# Patient Record
Sex: FEMALE | Race: WHITE | Employment: OTHER | URBAN - NONMETROPOLITAN AREA
[De-identification: names, ages, dates, MRNs, and addresses within clinical notes are randomized per-mention and may not be internally consistent; named-entity substitution may affect disease eponyms.]

---

## 2017-05-23 ENCOUNTER — OFFICE VISIT (OUTPATIENT)
Dept: FAMILY MEDICINE CLINIC | Age: 77
End: 2017-05-23

## 2017-05-23 VITALS
SYSTOLIC BLOOD PRESSURE: 160 MMHG | BODY MASS INDEX: 25.43 KG/M2 | HEART RATE: 60 BPM | HEIGHT: 67 IN | OXYGEN SATURATION: 98 % | DIASTOLIC BLOOD PRESSURE: 82 MMHG | WEIGHT: 162 LBS

## 2017-05-23 DIAGNOSIS — L85.3 XEROSIS OF SKIN: ICD-10-CM

## 2017-05-23 DIAGNOSIS — L57.0 ACTINIC KERATOSIS: Primary | ICD-10-CM

## 2017-05-23 PROCEDURE — 1036F TOBACCO NON-USER: CPT | Performed by: FAMILY MEDICINE

## 2017-05-23 PROCEDURE — 17110 DESTRUCTION B9 LES UP TO 14: CPT | Performed by: FAMILY MEDICINE

## 2017-05-23 PROCEDURE — 99999 PR OFFICE/OUTPT VISIT,PROCEDURE ONLY: CPT | Performed by: FAMILY MEDICINE

## 2017-05-23 ASSESSMENT — PATIENT HEALTH QUESTIONNAIRE - PHQ9
SUM OF ALL RESPONSES TO PHQ9 QUESTIONS 1 & 2: 0
SUM OF ALL RESPONSES TO PHQ QUESTIONS 1-9: 0
1. LITTLE INTEREST OR PLEASURE IN DOING THINGS: 0
2. FEELING DOWN, DEPRESSED OR HOPELESS: 0

## 2017-05-23 ASSESSMENT — ENCOUNTER SYMPTOMS
SHORTNESS OF BREATH: 0
ABDOMINAL PAIN: 0

## 2017-06-29 ENCOUNTER — OFFICE VISIT (OUTPATIENT)
Dept: FAMILY MEDICINE CLINIC | Age: 77
End: 2017-06-29

## 2017-06-29 VITALS
WEIGHT: 159.2 LBS | OXYGEN SATURATION: 98 % | HEIGHT: 67 IN | DIASTOLIC BLOOD PRESSURE: 60 MMHG | SYSTOLIC BLOOD PRESSURE: 128 MMHG | HEART RATE: 58 BPM | BODY MASS INDEX: 24.99 KG/M2

## 2017-06-29 DIAGNOSIS — D36.10 NEUROFIBROMA: Primary | ICD-10-CM

## 2017-06-29 DIAGNOSIS — L57.0 ACTINIC KERATOSES: ICD-10-CM

## 2017-06-29 PROCEDURE — 17110 DESTRUCTION B9 LES UP TO 14: CPT | Performed by: FAMILY MEDICINE

## 2017-06-29 PROCEDURE — 11303 SHAVE SKIN LESION >2.0 CM: CPT | Performed by: FAMILY MEDICINE

## 2017-06-29 PROCEDURE — 1036F TOBACCO NON-USER: CPT | Performed by: FAMILY MEDICINE

## 2017-06-29 ASSESSMENT — ENCOUNTER SYMPTOMS
ABDOMINAL PAIN: 0
SHORTNESS OF BREATH: 0

## 2017-06-30 ENCOUNTER — TELEPHONE (OUTPATIENT)
Dept: FAMILY MEDICINE CLINIC | Age: 77
End: 2017-06-30

## 2017-06-30 RX ORDER — CEPHALEXIN 500 MG/1
500 CAPSULE ORAL 3 TIMES DAILY
Qty: 30 CAPSULE | Refills: 0 | Status: SHIPPED | OUTPATIENT
Start: 2017-06-30 | End: 2018-03-19 | Stop reason: ALTCHOICE

## 2017-07-03 ENCOUNTER — OFFICE VISIT (OUTPATIENT)
Dept: FAMILY MEDICINE CLINIC | Age: 77
End: 2017-07-03

## 2017-07-03 VITALS
HEART RATE: 55 BPM | WEIGHT: 157.6 LBS | DIASTOLIC BLOOD PRESSURE: 60 MMHG | OXYGEN SATURATION: 97 % | HEIGHT: 67 IN | SYSTOLIC BLOOD PRESSURE: 120 MMHG | BODY MASS INDEX: 24.74 KG/M2

## 2017-07-03 DIAGNOSIS — S81.802A WOUND OF LEFT LEG, INITIAL ENCOUNTER: Primary | ICD-10-CM

## 2017-07-03 PROCEDURE — 99024 POSTOP FOLLOW-UP VISIT: CPT | Performed by: FAMILY MEDICINE

## 2017-07-03 ASSESSMENT — ENCOUNTER SYMPTOMS
ABDOMINAL PAIN: 0
SHORTNESS OF BREATH: 0

## 2018-03-19 ENCOUNTER — OFFICE VISIT (OUTPATIENT)
Dept: FAMILY MEDICINE CLINIC | Age: 78
End: 2018-03-19
Payer: MEDICARE

## 2018-03-19 VITALS
HEART RATE: 55 BPM | BODY MASS INDEX: 24.86 KG/M2 | HEIGHT: 67 IN | DIASTOLIC BLOOD PRESSURE: 62 MMHG | OXYGEN SATURATION: 96 % | SYSTOLIC BLOOD PRESSURE: 128 MMHG | WEIGHT: 158.4 LBS

## 2018-03-19 DIAGNOSIS — B07.9 VIRAL WARTS, UNSPECIFIED TYPE: ICD-10-CM

## 2018-03-19 DIAGNOSIS — L57.0 ACTINIC KERATOSES: Primary | ICD-10-CM

## 2018-03-19 PROCEDURE — 99212 OFFICE O/P EST SF 10 MIN: CPT | Performed by: FAMILY MEDICINE

## 2018-03-19 PROCEDURE — G8400 PT W/DXA NO RESULTS DOC: HCPCS | Performed by: FAMILY MEDICINE

## 2018-03-19 PROCEDURE — 17003 DESTRUCT PREMALG LES 2-14: CPT | Performed by: FAMILY MEDICINE

## 2018-03-19 PROCEDURE — 4040F PNEUMOC VAC/ADMIN/RCVD: CPT | Performed by: FAMILY MEDICINE

## 2018-03-19 PROCEDURE — G8427 DOCREV CUR MEDS BY ELIG CLIN: HCPCS | Performed by: FAMILY MEDICINE

## 2018-03-19 PROCEDURE — 1123F ACP DISCUSS/DSCN MKR DOCD: CPT | Performed by: FAMILY MEDICINE

## 2018-03-19 PROCEDURE — 3017F COLORECTAL CA SCREEN DOC REV: CPT | Performed by: FAMILY MEDICINE

## 2018-03-19 PROCEDURE — 1036F TOBACCO NON-USER: CPT | Performed by: FAMILY MEDICINE

## 2018-03-19 PROCEDURE — 1090F PRES/ABSN URINE INCON ASSESS: CPT | Performed by: FAMILY MEDICINE

## 2018-03-19 PROCEDURE — G8484 FLU IMMUNIZE NO ADMIN: HCPCS | Performed by: FAMILY MEDICINE

## 2018-03-19 PROCEDURE — G8420 CALC BMI NORM PARAMETERS: HCPCS | Performed by: FAMILY MEDICINE

## 2018-03-19 PROCEDURE — 17000 DESTRUCT PREMALG LESION: CPT | Performed by: FAMILY MEDICINE

## 2018-03-19 ASSESSMENT — ENCOUNTER SYMPTOMS
SHORTNESS OF BREATH: 0
ABDOMINAL PAIN: 0

## 2018-03-19 NOTE — PROGRESS NOTES
Subjective  Jimbo Guerrero, 78 y.o. female presents today with:  Chief Complaint   Patient presents with    6 Month Follow-Up       HPI     Here today for 6 month f/u on skin. She has a h/o AK's. The excision from her left lower leg has healed well. Also c/o wart on right index finger. Really does not want LN2 for wart. Review of Systems   Constitutional: Negative for fever. Respiratory: Negative for shortness of breath. Cardiovascular: Negative for chest pain. Gastrointestinal: Negative for abdominal pain. Skin: Negative for rash. Past Medical History:   Diagnosis Date    Actinic keratoses     Hyperlipidemia     Hypertension     Hypothyroidism     Type II or unspecified type diabetes mellitus without mention of complication, not stated as uncontrolled     diet controlled    Xerosis of skin      History reviewed. No pertinent surgical history. Social History     Social History    Marital status:      Spouse name: N/A    Number of children: N/A    Years of education: N/A     Occupational History    Not on file. Social History Main Topics    Smoking status: Never Smoker    Smokeless tobacco: Never Used    Alcohol use Not on file    Drug use: No    Sexual activity: Yes     Other Topics Concern    Not on file     Social History Narrative    No narrative on file     History reviewed. No pertinent family history.   Allergies   Allergen Reactions    Erythromycin     Pcn [Penicillins]     Sulfa Antibiotics      Current Outpatient Prescriptions   Medication Sig Dispense Refill    ammonium lactate (LAC-HYDRIN) 12 % lotion Apply topically twice a day as needed 500 g 5    carvedilol (COREG) 25 MG tablet Take 25 mg by mouth 2 times daily (with meals)      levothyroxine (SYNTHROID) 75 MCG tablet       lisinopril (PRINIVIL;ZESTRIL) 20 MG tablet       lovastatin (MEVACOR) 20 MG tablet       lisinopril-hydrochlorothiazide (PRINZIDE;ZESTORETIC) 20-25 MG per tablet        No current facility-administered medications for this visit. Objective    Vitals:    03/19/18 0937   BP: 128/62   Pulse: 55   SpO2: 96%   Weight: 158 lb 6.4 oz (71.8 kg)   Height: 5' 7\" (1.702 m)       Physical Exam   Constitutional: She appears well-developed and well-nourished. HENT:   Head: Normocephalic and atraumatic. Mouth/Throat: No oropharyngeal exudate. Neck: Normal range of motion. Neck supple. Skin: Skin is warm and dry. Left lower leg with well healed surgical site with no evidence of residual or recurrence of the lesion    Erythematous scaly papules in the following locations: right upper arm x 1, left dorsal hand x 1, right forearm x 1, anterior to left ear x 1, left cheek x 1, under left lateral eyebrow x 1, right upper chest x 1, left upper chest x 1, left upper back x 1. Skin colored verrucous papules in the following locations: right index finger x 1. Psychiatric: She has a normal mood and affect. Assessment & Plan   1. Actinic keratoses  85057 - [place] DESTRUC PREMALIGNANT,2-14 LESIONS   2. Viral warts, unspecified type       LN2 times 3 seconds to affected areas times 9 lesion(s). Informed consent given was given. Risks including infection, bleeding, scarring discussed. No guarantee how scars will look. Post op instructions given. Best to leave blisters alone if they form. If blister(s) pop or patient pops with a sterilized needed, apply antibiotic ointment and a bandage to affected area(s). For the wart, First file down the wart with a disposable nail file then throw away the nail file. Then make a vaseline well around the wart. Apply compound W on the wart twice a day and cover with bandaid. Orders Placed This Encounter   Procedures    42330 - [place] DESTRUC PREMALIGNANT,2-14 LESIONS     No orders of the defined types were placed in this encounter.     Medications Discontinued During This Encounter   Medication Reason    cephALEXin (KEFLEX) 500 MG capsule Therapy completed     Return in about 6 months (around 9/19/2018).     Oretha Baumgarten, MD

## 2018-08-29 ENCOUNTER — OFFICE VISIT (OUTPATIENT)
Dept: FAMILY MEDICINE CLINIC | Age: 78
End: 2018-08-29
Payer: MEDICARE

## 2018-08-29 ENCOUNTER — TELEPHONE (OUTPATIENT)
Dept: FAMILY MEDICINE CLINIC | Age: 78
End: 2018-08-29

## 2018-08-29 VITALS
BODY MASS INDEX: 24.75 KG/M2 | SYSTOLIC BLOOD PRESSURE: 130 MMHG | DIASTOLIC BLOOD PRESSURE: 70 MMHG | WEIGHT: 154 LBS | HEIGHT: 66 IN | HEART RATE: 58 BPM | OXYGEN SATURATION: 98 %

## 2018-08-29 DIAGNOSIS — K13.0 LIP LESION: ICD-10-CM

## 2018-08-29 DIAGNOSIS — K13.70 MOUTH LESION: ICD-10-CM

## 2018-08-29 DIAGNOSIS — E03.9 HYPOTHYROIDISM, UNSPECIFIED TYPE: ICD-10-CM

## 2018-08-29 DIAGNOSIS — I10 ESSENTIAL HYPERTENSION: Primary | ICD-10-CM

## 2018-08-29 DIAGNOSIS — K13.0 ANGULAR CHEILITIS: Primary | ICD-10-CM

## 2018-08-29 PROCEDURE — 4040F PNEUMOC VAC/ADMIN/RCVD: CPT | Performed by: FAMILY MEDICINE

## 2018-08-29 PROCEDURE — 1123F ACP DISCUSS/DSCN MKR DOCD: CPT | Performed by: FAMILY MEDICINE

## 2018-08-29 PROCEDURE — 99213 OFFICE O/P EST LOW 20 MIN: CPT | Performed by: FAMILY MEDICINE

## 2018-08-29 PROCEDURE — G8420 CALC BMI NORM PARAMETERS: HCPCS | Performed by: FAMILY MEDICINE

## 2018-08-29 PROCEDURE — G8400 PT W/DXA NO RESULTS DOC: HCPCS | Performed by: FAMILY MEDICINE

## 2018-08-29 PROCEDURE — 1036F TOBACCO NON-USER: CPT | Performed by: FAMILY MEDICINE

## 2018-08-29 PROCEDURE — 1090F PRES/ABSN URINE INCON ASSESS: CPT | Performed by: FAMILY MEDICINE

## 2018-08-29 PROCEDURE — 1101F PT FALLS ASSESS-DOCD LE1/YR: CPT | Performed by: FAMILY MEDICINE

## 2018-08-29 PROCEDURE — G8427 DOCREV CUR MEDS BY ELIG CLIN: HCPCS | Performed by: FAMILY MEDICINE

## 2018-08-29 PROCEDURE — 3017F COLORECTAL CA SCREEN DOC REV: CPT | Performed by: FAMILY MEDICINE

## 2018-08-29 RX ORDER — CARVEDILOL 12.5 MG/1
12.5 TABLET ORAL 2 TIMES DAILY WITH MEALS
Qty: 60 TABLET | Refills: 0
Start: 2018-08-29

## 2018-08-29 RX ORDER — LEVOTHYROXINE SODIUM 0.07 MG/1
TABLET ORAL
Qty: 15 TABLET | Refills: 0
Start: 2018-08-29

## 2018-08-29 ASSESSMENT — ENCOUNTER SYMPTOMS
COUGH: 0
SHORTNESS OF BREATH: 0
ABDOMINAL PAIN: 0
DIARRHEA: 0
SORE THROAT: 0

## 2018-08-29 NOTE — PROGRESS NOTES
Subjective  Justin German, 78 y.o. female presents today with:  Chief Complaint   Patient presents with    Lesion(s)     lesion on lip       Rash   This is a new problem. The current episode started 1 to 4 weeks ago. The problem has been waxing and waning since onset. Location: left corner of mouth. The rash is characterized by redness, dryness and burning. She was exposed to nothing. Pertinent negatives include no cough, diarrhea, fever, shortness of breath or sore throat. Treatments tried: Orvis Carolyn. The treatment provided mild relief. Also c/o wart on right index finger. Does not want LN2 for wart. She has her regular 6 month f/u next month. Review of Systems   Constitutional: Negative for fever. HENT: Negative for sore throat. Respiratory: Negative for cough and shortness of breath. Cardiovascular: Negative for chest pain. Gastrointestinal: Negative for abdominal pain and diarrhea. Skin: Negative for rash. Past Medical History:   Diagnosis Date    Actinic keratoses     Hyperlipidemia     Hypertension     Hypothyroidism     Type II or unspecified type diabetes mellitus without mention of complication, not stated as uncontrolled     diet controlled    Xerosis of skin      No past surgical history on file. Social History     Social History    Marital status:      Spouse name: N/A    Number of children: N/A    Years of education: N/A     Occupational History    Not on file. Social History Main Topics    Smoking status: Never Smoker    Smokeless tobacco: Never Used    Alcohol use Not on file    Drug use: No    Sexual activity: Yes     Other Topics Concern    Not on file     Social History Narrative    No narrative on file     No family history on file.   Allergies   Allergen Reactions    Erythromycin     Other      Flu vaccines    Pcn [Penicillins]     Sulfa Antibiotics      Current Outpatient Prescriptions   Medication Sig Dispense Refill    mupirocin (BACTROBAN) 2 % ointment Apply 3 times daily and at night to left corner of mouth. 30 g 1    carvedilol (COREG) 25 MG tablet Take 25 mg by mouth 2 times daily (with meals)      levothyroxine (SYNTHROID) 75 MCG tablet       lisinopril-hydrochlorothiazide (PRINZIDE;ZESTORETIC) 20-25 MG per tablet       lisinopril (PRINIVIL;ZESTRIL) 20 MG tablet       lovastatin (MEVACOR) 20 MG tablet        No current facility-administered medications for this visit. Objective    Vitals:    08/29/18 0832   Weight: 154 lb (69.9 kg)   Height: 5' 6\" (1.676 m)       Physical Exam   Constitutional: She appears well-developed and well-nourished. HENT:   Head: Normocephalic and atraumatic. Mouth/Throat: No oropharyngeal exudate. Neck: Normal range of motion. Neck supple. Skin: Skin is warm and dry. Skin colored verrucous papules in the following locations: right index finger x 1. Corner of mouth with erythema and scaling. Psychiatric: She has a normal mood and affect. Assessment & Plan    Diagnosis Orders   1. Angular cheilitis  mupirocin (BACTROBAN) 2 % ointment   2. Lip lesion  Amb External Referral To General Surgery   3. Mouth lesion  Amb External Referral To General Surgery     Plan as noted above. Use mupirocin tid and at night. Referred to oral surgery if not better in 2 weeks. F/u as scheduled for LN2. Will send rx for fluowart for wart. Orders Placed This Encounter   Procedures    Amb External Referral To General Surgery     Referral Priority:   Routine     Requested Specialty:   Oral Surgery     Number of Visits Requested:   1     Orders Placed This Encounter   Medications    mupirocin (BACTROBAN) 2 % ointment     Sig: Apply 3 times daily and at night to left corner of mouth.      Dispense:  30 g     Refill:  1     Medications Discontinued During This Encounter   Medication Reason    ammonium lactate (LAC-HYDRIN) 12 % lotion LIST CLEANUP     Return in about 2 weeks (around 9/12/2018) for as scheduled.     Madison Moore MD

## 2018-09-10 ENCOUNTER — OFFICE VISIT (OUTPATIENT)
Dept: FAMILY MEDICINE CLINIC | Age: 78
End: 2018-09-10
Payer: MEDICARE

## 2018-09-10 VITALS
SYSTOLIC BLOOD PRESSURE: 120 MMHG | HEIGHT: 65 IN | BODY MASS INDEX: 25.66 KG/M2 | WEIGHT: 154 LBS | DIASTOLIC BLOOD PRESSURE: 80 MMHG

## 2018-09-10 DIAGNOSIS — L65.9 THINNING HAIR: ICD-10-CM

## 2018-09-10 DIAGNOSIS — L57.0 ACTINIC KERATOSES: Primary | ICD-10-CM

## 2018-09-10 DIAGNOSIS — L29.9 PRURITUS OF SKIN: ICD-10-CM

## 2018-09-10 PROCEDURE — 99213 OFFICE O/P EST LOW 20 MIN: CPT | Performed by: FAMILY MEDICINE

## 2018-09-10 PROCEDURE — 17110 DESTRUCTION B9 LES UP TO 14: CPT | Performed by: FAMILY MEDICINE

## 2018-09-10 ASSESSMENT — ENCOUNTER SYMPTOMS
SHORTNESS OF BREATH: 0
ABDOMINAL PAIN: 0

## 2018-09-10 NOTE — PROGRESS NOTES
Subjective  Leafy Seeds, 78 y.o. female presents today with:  Chief Complaint   Patient presents with    Check-Up     skin       HPI   Here today for 6 month skin check. I saw her in August for a wart on right index finger that she is using compond W on. She also saw the oral surgeon and he dentis since I last saw her who told her that the lesion inside her mouth on the left was nothing to worry about. She has some new itchy spots. Rash gone at corner of mouth. Also c/o thinning hair, no discrete bald patches. Review of Systems   Constitutional: Negative for fever. Respiratory: Negative for shortness of breath. Cardiovascular: Negative for chest pain. Gastrointestinal: Negative for abdominal pain. Skin: Negative for rash. Past Medical History:   Diagnosis Date    Actinic keratoses     Hyperlipidemia     Hypertension     Hypothyroidism     Type II or unspecified type diabetes mellitus without mention of complication, not stated as uncontrolled     diet controlled    Xerosis of skin      No past surgical history on file. Social History     Social History    Marital status:      Spouse name: N/A    Number of children: N/A    Years of education: N/A     Occupational History    Not on file. Social History Main Topics    Smoking status: Never Smoker    Smokeless tobacco: Never Used    Alcohol use Not on file    Drug use: No    Sexual activity: Yes     Other Topics Concern    Not on file     Social History Narrative    No narrative on file     No family history on file.   Allergies   Allergen Reactions    Erythromycin     Other      Flu vaccines    Pcn [Penicillins]     Sulfa Antibiotics      Current Outpatient Prescriptions   Medication Sig Dispense Refill    carvedilol (COREG) 12.5 MG tablet Take 1 tablet by mouth 2 times daily (with meals) 60 tablet 0    levothyroxine (SYNTHROID) 75 MCG tablet 1/2 tab po daily 15 tablet 0    lisinopril-hydrochlorothiazide (PRINZIDE;ZESTORETIC) 20-25 MG per tablet       lisinopril (PRINIVIL;ZESTRIL) 20 MG tablet       lovastatin (MEVACOR) 20 MG tablet        No current facility-administered medications for this visit. Objective    Vitals:    09/10/18 1103   BP: 120/80   Weight: 154 lb (69.9 kg)   Height: 5' 5\" (1.651 m)       Physical Exam   Constitutional: She appears well-developed and well-nourished. HENT:   Head: Normocephalic and atraumatic. Mouth/Throat: No oropharyngeal exudate. Neck: Normal range of motion. Neck supple. Skin: Skin is warm and dry. Erythematous scaly papules in the following locations: right lateral cheek x 2, left lower leg x 1,    Psychiatric: She has a normal mood and affect. Assessment & Plan    Diagnosis Orders   1. Actinic keratoses  01714 - [place] DESTRUC PREMALIGNANT,2-14 LESIONS   2. Pruritus of skin  43987 - [place] DESTRUC PREMALIGNANT,2-14 LESIONS   3. Thinning hair       LN2 times 3 seconds to affected areas times 3 lesion(s). Informed consent given was given. Risks including infection, bleeding, scarring discussed. No guarantee how scars will look. Post op instructions given. Best to leave blisters alone if they form. If blister(s) pop or patient pops with a sterilized needed, apply antibiotic ointment and a bandage to affected area(s). For hair loss the following is recommended:  a multivitamin daily such as Centrum or Centrum Silver for adults 50 and older, Biotin Forte 5 mg daily, and 4 ounces of soy daily (if there is no family history of breast cancer). You should also consider a shampoo and conditioning line called Nioxin which is available at your hair salon. Another option is to consider the use of Rogaine for men or women. Orders Placed This Encounter   Procedures    40535 - [place] DESTRUC PREMALIGNANT,2-14 LESIONS     No orders of the defined types were placed in this encounter. There are no discontinued medications.   Return in about 6 months (around 3/10/2019).     Johnathan Strickland MD

## 2023-11-14 ENCOUNTER — APPOINTMENT (OUTPATIENT)
Dept: URBAN - METROPOLITAN AREA CLINIC 204 | Age: 83
Setting detail: DERMATOLOGY
End: 2023-11-15

## 2023-11-14 PROCEDURE — 99213 OFFICE O/P EST LOW 20 MIN: CPT | Mod: 25

## 2023-11-14 PROCEDURE — 17003 DESTRUCT PREMALG LES 2-14: CPT

## 2023-11-14 PROCEDURE — 17000 DESTRUCT PREMALG LESION: CPT

## 2023-11-14 PROCEDURE — OTHER MIPS QUALITY: OTHER

## 2023-12-15 ENCOUNTER — OFFICE VISIT (OUTPATIENT)
Dept: CARDIOLOGY | Facility: CLINIC | Age: 83
End: 2023-12-15
Payer: MEDICARE

## 2023-12-15 VITALS
SYSTOLIC BLOOD PRESSURE: 152 MMHG | DIASTOLIC BLOOD PRESSURE: 64 MMHG | OXYGEN SATURATION: 99 % | HEART RATE: 65 BPM | WEIGHT: 151 LBS | BODY MASS INDEX: 24.37 KG/M2

## 2023-12-15 DIAGNOSIS — E11.9 DIABETES MELLITUS TYPE II, NON INSULIN DEPENDENT (MULTI): ICD-10-CM

## 2023-12-15 DIAGNOSIS — R42 DIZZINESS: ICD-10-CM

## 2023-12-15 DIAGNOSIS — E78.5 HYPERLIPIDEMIA, UNSPECIFIED HYPERLIPIDEMIA TYPE: ICD-10-CM

## 2023-12-15 DIAGNOSIS — R06.02 SHORTNESS OF BREATH: ICD-10-CM

## 2023-12-15 DIAGNOSIS — I35.0 NONRHEUMATIC AORTIC (VALVE) STENOSIS: Primary | ICD-10-CM

## 2023-12-15 DIAGNOSIS — I10 HYPERTENSION, UNSPECIFIED TYPE: ICD-10-CM

## 2023-12-15 PROCEDURE — 1036F TOBACCO NON-USER: CPT | Performed by: STUDENT IN AN ORGANIZED HEALTH CARE EDUCATION/TRAINING PROGRAM

## 2023-12-15 PROCEDURE — 99215 OFFICE O/P EST HI 40 MIN: CPT | Performed by: STUDENT IN AN ORGANIZED HEALTH CARE EDUCATION/TRAINING PROGRAM

## 2023-12-15 PROCEDURE — 3077F SYST BP >= 140 MM HG: CPT | Performed by: STUDENT IN AN ORGANIZED HEALTH CARE EDUCATION/TRAINING PROGRAM

## 2023-12-15 PROCEDURE — 3078F DIAST BP <80 MM HG: CPT | Performed by: STUDENT IN AN ORGANIZED HEALTH CARE EDUCATION/TRAINING PROGRAM

## 2023-12-15 PROCEDURE — 1160F RVW MEDS BY RX/DR IN RCRD: CPT | Performed by: STUDENT IN AN ORGANIZED HEALTH CARE EDUCATION/TRAINING PROGRAM

## 2023-12-15 PROCEDURE — 1159F MED LIST DOCD IN RCRD: CPT | Performed by: STUDENT IN AN ORGANIZED HEALTH CARE EDUCATION/TRAINING PROGRAM

## 2023-12-15 PROCEDURE — 93000 ELECTROCARDIOGRAM COMPLETE: CPT | Performed by: STUDENT IN AN ORGANIZED HEALTH CARE EDUCATION/TRAINING PROGRAM

## 2023-12-15 RX ORDER — LISINOPRIL AND HYDROCHLOROTHIAZIDE 20; 25 MG/1; MG/1
1 TABLET ORAL
COMMUNITY
Start: 2014-07-17

## 2023-12-15 RX ORDER — AMLODIPINE BESYLATE 5 MG/1
5 TABLET ORAL DAILY
COMMUNITY
End: 2024-02-26 | Stop reason: SDUPTHER

## 2023-12-15 RX ORDER — LISINOPRIL 20 MG/1
20 TABLET ORAL NIGHTLY
COMMUNITY
Start: 2014-07-17

## 2023-12-15 RX ORDER — ASPIRIN 81 MG/1
1 TABLET ORAL DAILY
COMMUNITY

## 2023-12-15 RX ORDER — LOVASTATIN 20 MG/1
20 TABLET ORAL NIGHTLY
COMMUNITY
Start: 2018-03-09

## 2023-12-15 RX ORDER — CARVEDILOL 6.25 MG/1
1 TABLET ORAL 2 TIMES DAILY
COMMUNITY
Start: 2023-01-14

## 2023-12-15 RX ORDER — LEVOTHYROXINE SODIUM 75 UG/1
75 TABLET ORAL
COMMUNITY
Start: 2018-08-29

## 2023-12-15 NOTE — PROGRESS NOTES
Chief Complaint   Patient presents with    Dizziness    Shortness of Breath     Pain on her left side in her neck and under her left breast.      HPI:  I was requested by Dr. Jenkins to evaluate this patient in consultation for cardiac assessment.    Mrs. Debo Rodriguez is a 83 y.o. year old never smoker diabetic female patient with prior medical history significant for hypertension, diabetes, hyperlipidemia, moderate aortic stenosis, hypothyroidism. She is currently oligosymptomatic. She endorses shortness of breath on exertion (walking > 10 min) and some episodes of dizziness. She denies  Patient denies chest pain, palpitations, leg edema, headaches, fever, chills, orthopnea, paroxysmal nocturnal dyspnea or syncope.   Serial echocardiograms performed 2022 and 2023 showed likely moderate aortic regurgitation.   EKG shows normal sinus rhythm with incomplete RBBB and no signs of ACS.  Echo (01/2023):   1. Left ventricular systolic function is normal with a 60% estimated ejection fraction.  2. Moderate aortic valve regurgitation.  3. No significant changes compared to prior echocardiogram dated 1/3/2022.      Past Medical History  Past Medical History:   Diagnosis Date    Asymptomatic menopausal state     History of menopause    Encounter for full-term uncomplicated delivery     Normal vaginal delivery    Encounter for gynecological examination (general) (routine) without abnormal findings     Pap test, as part of routine gynecological examination    Other conditions influencing health status     Menstruation    Personal history of other medical treatment     H/O bone density study    Personal history of other medical treatment     History of mammogram       Past Surgical History  Past Surgical History:   Procedure Laterality Date    OTHER SURGICAL HISTORY  12/03/2021    Cataract surgery       Past Family History  No family history on file.    Allergy History  Allergies   Allergen Reactions    Erythromycin  Anaphylaxis, Palpitations and Unknown    Flu Vaccine 2011 (36 Mos+)(Pf) Unknown    Sulfa (Sulfonamide Antibiotics) Other    Penicillins Hives and Unknown       Past Social History  Social History     Socioeconomic History    Marital status:      Spouse name: None    Number of children: None    Years of education: None    Highest education level: None   Occupational History    None   Tobacco Use    Smoking status: Never    Smokeless tobacco: Never   Substance and Sexual Activity    Alcohol use: Never    Drug use: Never    Sexual activity: None   Other Topics Concern    None   Social History Narrative    None     Social Determinants of Health     Financial Resource Strain: Not on file   Food Insecurity: Not on file   Transportation Needs: Not on file   Physical Activity: Not on file   Stress: Not on file   Social Connections: Not on file   Intimate Partner Violence: Not on file   Housing Stability: Not on file       Social History     Tobacco Use   Smoking Status Never   Smokeless Tobacco Never       Review of Systems:  Constitutional: Denies any fever or chills  Eyes: Denies any eye pain or blurry vision  ENT: Denies any ear pain or hearing loss  Cardiovascular: The heart rate is not slow, the heart rate is not fast  Respiratory: Denies any asthma/wheezing  Gastrointestinal: Denies any tammy colored stools or fatty food intolerance  Genitourinary: Denies any blood in the urine or pelvic pain  Musculoskeletal: Denies any swelling in the joints or difficulty walking  Skin: Denies any skin lumps or skin lesions  Neurological: Denies any dizziness/tingling     Objective Data:  Last Recorded Vitals:  Vitals:    12/15/23 1041   BP: 152/64   Pulse: 65   SpO2: 99%   Weight: 68.5 kg (151 lb)       Last Labs:  CBC - No results in last year.  _ _ _    _      CMP - No results in last year.  _ _ _ --- _   _ _ _ _      PTT - No results in last year.  _   _ _     HGBA1C   Date/Time Value Ref Range Status   08/10/2023 09:07 AM  7.5 4.0 - 5.6 % Final   11/11/2022 11:04 AM 7.1 4.0 - 6.0 % Final        Patient Medications:  Outpatient Encounter Medications as of 12/15/2023   Medication Sig Dispense Refill    amLODIPine (Norvasc) 5 mg tablet Take 1 tablet (5 mg) by mouth once daily.      aspirin 81 mg EC tablet Take 1 tablet (81 mg) by mouth once daily.      carvedilol (Coreg) 6.25 mg tablet Take 1 tablet (6.25 mg) by mouth twice a day.      levothyroxine (Synthroid, Levoxyl) 75 mcg tablet Take 1 tablet (75 mcg) by mouth once daily.      lisinopril 20 mg tablet Take 1 tablet (20 mg) by mouth once daily at bedtime.      lisinopriL-hydrochlorothiazide 20-25 mg tablet Take 1 tablet by mouth once daily.      lovastatin (Mevacor) 20 mg tablet Take 1 tablet (20 mg) by mouth once daily at bedtime.       No facility-administered encounter medications on file as of 12/15/2023.       Physical Exam:  General: alert, oriented and in no acute distress  HEENT: NC/AT; EOMI; PERRLA, external ear is normal  Neck: supple; trachea midline; no masses; no JVD  Chest: lungs clear to auscultation bilaterally; no wheezing  CVS: regular rhythm, S1S2 normal, systolic murmur 2+/6+ RUSB, no radiation.  Abdomen: Soft, non-tender, non-distension, no organomegaly  Extremities: no clubbing/cyanosis/edema  Neuro: Grossly intact     Psychiatric: Normal mood and affect    Past Cardiology Results (Last 3 Years):  EKG:  No results found for this or any previous visit from the past 1095 days.    Echo (01/2023):  CONCLUSIONS:  1. Left ventricular systolic function is normal with a 60% estimated ejection fraction.  2. Moderate aortic valve regurgitation.  3. No significant changes compared to prior echocardiogram dated 1/3/2022.     Cath:  No results found for this or any previous visit from the past 1095 days.    CV NCDR CATHPCI V5 COLLECTION FORM   Stress Test:  Nuclear Stress Test 05/26/2022    Cardiac Imaging:  No results found for this or any previous visit from the past 1095  days.       Assessment/Plan   In summary, Mrs. Debo Rodriguez is a 83 y.o. year old never smoker female patient with prior medical history significant for hypertension, diabetes, hyperlipidemia, moderate aortic stenosis, hypothyroidism. She is currently asymptomatic. Serial echocardiograms performed 2022 and 2023 showed likely moderate aortic regurgitation. She denies  Patient denies chest pain, shortness of breath, palpitations, leg edema, lightheadedness, headaches, fever, chills, orthopnea, paroxysmal nocturnal dyspnea or syncope.    # Moderate Aortic Stenosis:  - Patient currently oligosymptomatic.  - Reports shortness of breath on over exertion (walking > 10 min).  - EKG shows normal sinus rhythm with incomplete RBBB and no signs of ACS.  - Echo (01/2023):   1. Left ventricular systolic function is normal with a 60% estimated ejection fraction.  2. Moderate aortic valve regurgitation.  3. No significant changes compared to prior echocardiogram dated 1/3/2022.  - Will keep conservative treatment at this point. Patient is aware that she might need a procedure at some point.  - Keep ASA 81mg daily, Lovastatin 20mg daily.  - Follow up in 1 year with new echo.    # Hypertension  - Controlled blood pressure.  - Keep home medication with Amlodipine 5mg daily, Lisinopril 20mg / hydrochlorothiazide 25mg daily, Carvedilol 6.25mg daily.  - Patient counseled to keep a healthy lifestyle including low-sodium diet.  - Goal of BP < 130/80mmHg.    # Diabetes  - Controlled by PCP  - A1c 7.5%  - Would suggest to start her on Metformin.    # Hypothyroidism  - Controlled by PCP  - Keep Levothyroxine 75mcg daily.    This note was transcribed using the Dragon Dictation system. There may be grammatical, punctuation, or verbiage errors that occur with voice recognition programs.    Counseling greater than 50% of visit regarding all cardiac issues.    Thank you, Dr. Jenkins, for allowing me to participate in the care of this  patient. Please do not hesitate to contact me with any further questions or concerns.    Dario Huerta MD  Cardiology

## 2024-01-29 ENCOUNTER — OFFICE VISIT (OUTPATIENT)
Dept: PRIMARY CARE | Facility: CLINIC | Age: 84
End: 2024-01-29
Payer: MEDICARE

## 2024-01-29 VITALS
BODY MASS INDEX: 24.51 KG/M2 | OXYGEN SATURATION: 96 % | HEIGHT: 66 IN | DIASTOLIC BLOOD PRESSURE: 82 MMHG | SYSTOLIC BLOOD PRESSURE: 150 MMHG | WEIGHT: 152.5 LBS | HEART RATE: 92 BPM

## 2024-01-29 DIAGNOSIS — E03.9 HYPOTHYROIDISM (ACQUIRED): ICD-10-CM

## 2024-01-29 DIAGNOSIS — E78.5 HYPERLIPIDEMIA, UNSPECIFIED HYPERLIPIDEMIA TYPE: ICD-10-CM

## 2024-01-29 DIAGNOSIS — I10 ESSENTIAL HYPERTENSION: ICD-10-CM

## 2024-01-29 DIAGNOSIS — E11.9 TYPE 2 DIABETES MELLITUS WITHOUT COMPLICATION, WITHOUT LONG-TERM CURRENT USE OF INSULIN (MULTI): Primary | ICD-10-CM

## 2024-01-29 DIAGNOSIS — K21.9 GASTROESOPHAGEAL REFLUX DISEASE, UNSPECIFIED WHETHER ESOPHAGITIS PRESENT: ICD-10-CM

## 2024-01-29 DIAGNOSIS — G47.33 OBSTRUCTIVE SLEEP APNEA SYNDROME: ICD-10-CM

## 2024-01-29 PROBLEM — N18.2 CKD (CHRONIC KIDNEY DISEASE) STAGE 2, GFR 60-89 ML/MIN: Status: ACTIVE | Noted: 2024-01-29

## 2024-01-29 PROBLEM — M81.0 OSTEOPOROSIS: Status: ACTIVE | Noted: 2021-04-07

## 2024-01-29 PROCEDURE — 99213 OFFICE O/P EST LOW 20 MIN: CPT | Performed by: FAMILY MEDICINE

## 2024-01-29 PROCEDURE — 3077F SYST BP >= 140 MM HG: CPT | Performed by: FAMILY MEDICINE

## 2024-01-29 PROCEDURE — 1160F RVW MEDS BY RX/DR IN RCRD: CPT | Performed by: FAMILY MEDICINE

## 2024-01-29 PROCEDURE — 1036F TOBACCO NON-USER: CPT | Performed by: FAMILY MEDICINE

## 2024-01-29 PROCEDURE — 1159F MED LIST DOCD IN RCRD: CPT | Performed by: FAMILY MEDICINE

## 2024-01-29 PROCEDURE — 3079F DIAST BP 80-89 MM HG: CPT | Performed by: FAMILY MEDICINE

## 2024-01-29 RX ORDER — MULTIVITAMIN
1 TABLET ORAL
COMMUNITY

## 2024-01-29 RX ORDER — CALCIUM CARBONATE/VITAMIN D3 600MG-5MCG
1 TABLET ORAL DAILY
COMMUNITY

## 2024-01-29 RX ORDER — BIOTIN 10 MG
1 TABLET ORAL DAILY
COMMUNITY

## 2024-01-29 RX ORDER — MULTIVIT-MIN/IRON/FOLIC ACID/K 18-600-40
CAPSULE ORAL
COMMUNITY

## 2024-01-29 RX ORDER — IBUPROFEN 200 MG
200 TABLET ORAL
COMMUNITY
Start: 2018-03-09

## 2024-01-29 ASSESSMENT — ENCOUNTER SYMPTOMS
OCCASIONAL FEELINGS OF UNSTEADINESS: 0
LOSS OF SENSATION IN FEET: 0
DEPRESSION: 0

## 2024-01-29 NOTE — PATIENT INSTRUCTIONS
Hospitalist Will get labs, continue current medications.  Follow up in 6 months, sooner if needed.     Orthopedics

## 2024-01-29 NOTE — PROGRESS NOTES
Subjective   Debo Rodriguez is a 83 y.o. female who presents for No chief complaint on file..  Here to get established.  She has a h/o HTN, DM, high chol, GERD, osteoporosis, hypothyroidism, JOVANNA on CPAP - tolerating well.  She has a h/o GBS in 2009.  Developed diabetes at that time.  She has been diet controlled and is trying to avoid medication if she can.  She is feeling good.  No specific concerns at this time.                Objective   Visit Vitals  /82 (BP Location: Left arm, Patient Position: Sitting, BP Cuff Size: Adult)   Pulse 92      Physical Exam  Vitals reviewed.   Constitutional:       General: She is not in acute distress.  Cardiovascular:      Rate and Rhythm: Normal rate and regular rhythm.      Heart sounds: No murmur heard.  Pulmonary:      Effort: Pulmonary effort is normal. No respiratory distress.      Breath sounds: Normal breath sounds.   Skin:     General: Skin is warm and dry.   Neurological:      General: No focal deficit present.      Mental Status: She is alert. Mental status is at baseline.         Assessment/Plan   Problem List Items Addressed This Visit       Type 2 diabetes mellitus (CMS/HCC) - Primary    Relevant Orders    CBC and Auto Differential    Comprehensive Metabolic Panel    Hemoglobin A1C    Lipid Panel    TSH with reflex to Free T4 if abnormal    Vitamin B12    Albumin , Urine Random    Obstructive sleep apnea syndrome    Hypothyroidism (acquired)    Relevant Orders    CBC and Auto Differential    Comprehensive Metabolic Panel    Hemoglobin A1C    Lipid Panel    TSH with reflex to Free T4 if abnormal    Vitamin B12    Albumin , Urine Random    Hyperlipidemia    Relevant Orders    CBC and Auto Differential    Comprehensive Metabolic Panel    Hemoglobin A1C    Lipid Panel    TSH with reflex to Free T4 if abnormal    Vitamin B12    Albumin , Urine Random    Gastroesophageal reflux disease    Essential hypertension    Relevant Orders    CBC and Auto Differential     Comprehensive Metabolic Panel    Hemoglobin A1C    Lipid Panel    TSH with reflex to Free T4 if abnormal    Vitamin B12    Albumin , Urine Random          Rizwana Daley MD

## 2024-01-29 NOTE — PROGRESS NOTES
Subjective   Patient ID: Debo Rodriguez is a 83 y.o. female who presents for Establishing Primary Care     HPI     Review of Systems    Objective   There were no vitals taken for this visit.    Physical Exam    Assessment/Plan

## 2024-02-23 ENCOUNTER — LAB (OUTPATIENT)
Dept: LAB | Facility: LAB | Age: 84
End: 2024-02-23
Payer: MEDICARE

## 2024-02-23 DIAGNOSIS — E03.9 HYPOTHYROIDISM (ACQUIRED): ICD-10-CM

## 2024-02-23 DIAGNOSIS — E78.5 HYPERLIPIDEMIA, UNSPECIFIED HYPERLIPIDEMIA TYPE: ICD-10-CM

## 2024-02-23 DIAGNOSIS — E11.9 TYPE 2 DIABETES MELLITUS WITHOUT COMPLICATION, WITHOUT LONG-TERM CURRENT USE OF INSULIN (MULTI): ICD-10-CM

## 2024-02-23 DIAGNOSIS — I10 ESSENTIAL HYPERTENSION: ICD-10-CM

## 2024-02-23 LAB
ALBUMIN SERPL BCP-MCNC: 4.5 G/DL (ref 3.4–5)
ALP SERPL-CCNC: 62 U/L (ref 33–136)
ALT SERPL W P-5'-P-CCNC: 17 U/L (ref 7–45)
ANION GAP SERPL CALC-SCNC: 13 MMOL/L (ref 10–20)
AST SERPL W P-5'-P-CCNC: 19 U/L (ref 9–39)
BASOPHILS # BLD AUTO: 0.01 X10*3/UL (ref 0–0.1)
BASOPHILS NFR BLD AUTO: 0.1 %
BILIRUB SERPL-MCNC: 0.6 MG/DL (ref 0–1.2)
BUN SERPL-MCNC: 19 MG/DL (ref 6–23)
CALCIUM SERPL-MCNC: 10.1 MG/DL (ref 8.6–10.3)
CHLORIDE SERPL-SCNC: 102 MMOL/L (ref 98–107)
CHOLEST SERPL-MCNC: 140 MG/DL (ref 0–199)
CHOLESTEROL/HDL RATIO: 2.4
CO2 SERPL-SCNC: 27 MMOL/L (ref 21–32)
CREAT SERPL-MCNC: 0.68 MG/DL (ref 0.5–1.05)
EGFRCR SERPLBLD CKD-EPI 2021: 88 ML/MIN/1.73M*2
EOSINOPHIL # BLD AUTO: 0.08 X10*3/UL (ref 0–0.4)
EOSINOPHIL NFR BLD AUTO: 1.2 %
ERYTHROCYTE [DISTWIDTH] IN BLOOD BY AUTOMATED COUNT: 11.6 % (ref 11.5–14.5)
EST. AVERAGE GLUCOSE BLD GHB EST-MCNC: 194 MG/DL
GLUCOSE SERPL-MCNC: 180 MG/DL (ref 74–99)
HBA1C MFR BLD: 8.4 %
HCT VFR BLD AUTO: 40.1 % (ref 36–46)
HDLC SERPL-MCNC: 59 MG/DL
HGB BLD-MCNC: 13.8 G/DL (ref 12–16)
IMM GRANULOCYTES # BLD AUTO: 0.02 X10*3/UL (ref 0–0.5)
IMM GRANULOCYTES NFR BLD AUTO: 0.3 % (ref 0–0.9)
LDLC SERPL CALC-MCNC: 59 MG/DL
LYMPHOCYTES # BLD AUTO: 2.2 X10*3/UL (ref 0.8–3)
LYMPHOCYTES NFR BLD AUTO: 31.8 %
MCH RBC QN AUTO: 34 PG (ref 26–34)
MCHC RBC AUTO-ENTMCNC: 34.4 G/DL (ref 32–36)
MCV RBC AUTO: 99 FL (ref 80–100)
MONOCYTES # BLD AUTO: 0.86 X10*3/UL (ref 0.05–0.8)
MONOCYTES NFR BLD AUTO: 12.4 %
NEUTROPHILS # BLD AUTO: 3.74 X10*3/UL (ref 1.6–5.5)
NEUTROPHILS NFR BLD AUTO: 54.2 %
NON HDL CHOLESTEROL: 81 MG/DL (ref 0–149)
NRBC BLD-RTO: 0 /100 WBCS (ref 0–0)
PLATELET # BLD AUTO: 212 X10*3/UL (ref 150–450)
POTASSIUM SERPL-SCNC: 4 MMOL/L (ref 3.5–5.3)
PROT SERPL-MCNC: 7.2 G/DL (ref 6.4–8.2)
RBC # BLD AUTO: 4.06 X10*6/UL (ref 4–5.2)
SODIUM SERPL-SCNC: 138 MMOL/L (ref 136–145)
TRIGL SERPL-MCNC: 108 MG/DL (ref 0–149)
TSH SERPL-ACNC: 1.18 MIU/L (ref 0.44–3.98)
VIT B12 SERPL-MCNC: 400 PG/ML (ref 211–911)
VLDL: 22 MG/DL (ref 0–40)
WBC # BLD AUTO: 6.9 X10*3/UL (ref 4.4–11.3)

## 2024-02-23 PROCEDURE — 82607 VITAMIN B-12: CPT

## 2024-02-23 PROCEDURE — 83036 HEMOGLOBIN GLYCOSYLATED A1C: CPT

## 2024-02-23 PROCEDURE — 82570 ASSAY OF URINE CREATININE: CPT

## 2024-02-23 PROCEDURE — 80061 LIPID PANEL: CPT

## 2024-02-23 PROCEDURE — 36415 COLL VENOUS BLD VENIPUNCTURE: CPT

## 2024-02-23 PROCEDURE — 82043 UR ALBUMIN QUANTITATIVE: CPT

## 2024-02-23 PROCEDURE — 84443 ASSAY THYROID STIM HORMONE: CPT

## 2024-02-23 PROCEDURE — 85025 COMPLETE CBC W/AUTO DIFF WBC: CPT

## 2024-02-23 PROCEDURE — 80053 COMPREHEN METABOLIC PANEL: CPT

## 2024-02-24 LAB
CREAT UR-MCNC: 31.3 MG/DL (ref 20–320)
MICROALBUMIN UR-MCNC: 15.9 MG/L
MICROALBUMIN/CREAT UR: 50.8 UG/MG CREAT

## 2024-02-26 DIAGNOSIS — I10 ESSENTIAL HYPERTENSION: Primary | ICD-10-CM

## 2024-02-26 RX ORDER — AMLODIPINE BESYLATE 5 MG/1
5 TABLET ORAL DAILY
Qty: 90 TABLET | Refills: 1 | Status: SHIPPED | OUTPATIENT
Start: 2024-02-26

## 2024-02-28 DIAGNOSIS — E11.9 TYPE 2 DIABETES MELLITUS WITHOUT COMPLICATION, WITHOUT LONG-TERM CURRENT USE OF INSULIN (MULTI): Primary | ICD-10-CM

## 2024-02-28 NOTE — RESULT ENCOUNTER NOTE
Please let patient know that her A1c (sugar control) is a little worse than it had been.  She can either continue to work on diet/exercise or we can start some medication to help get the sugars down.  Otherwise labs were ok.  Thanks.

## 2024-02-29 RX ORDER — METFORMIN HYDROCHLORIDE 500 MG/1
500 TABLET, EXTENDED RELEASE ORAL
Qty: 90 TABLET | Refills: 1 | Status: SHIPPED | OUTPATIENT
Start: 2024-02-29 | End: 2024-08-27

## 2024-06-13 ENCOUNTER — APPOINTMENT (OUTPATIENT)
Dept: CARDIOLOGY | Facility: CLINIC | Age: 84
End: 2024-06-13
Payer: MEDICARE

## 2024-06-13 VITALS
DIASTOLIC BLOOD PRESSURE: 64 MMHG | HEART RATE: 67 BPM | SYSTOLIC BLOOD PRESSURE: 138 MMHG | BODY MASS INDEX: 23.29 KG/M2 | WEIGHT: 144.9 LBS | HEIGHT: 66 IN | OXYGEN SATURATION: 98 %

## 2024-06-13 DIAGNOSIS — R06.02 SHORTNESS OF BREATH: Primary | ICD-10-CM

## 2024-06-13 DIAGNOSIS — I35.1 MODERATE AORTIC REGURGITATION: ICD-10-CM

## 2024-06-13 PROCEDURE — 3075F SYST BP GE 130 - 139MM HG: CPT | Performed by: STUDENT IN AN ORGANIZED HEALTH CARE EDUCATION/TRAINING PROGRAM

## 2024-06-13 PROCEDURE — 1159F MED LIST DOCD IN RCRD: CPT | Performed by: STUDENT IN AN ORGANIZED HEALTH CARE EDUCATION/TRAINING PROGRAM

## 2024-06-13 PROCEDURE — 99214 OFFICE O/P EST MOD 30 MIN: CPT | Performed by: STUDENT IN AN ORGANIZED HEALTH CARE EDUCATION/TRAINING PROGRAM

## 2024-06-13 PROCEDURE — 3078F DIAST BP <80 MM HG: CPT | Performed by: STUDENT IN AN ORGANIZED HEALTH CARE EDUCATION/TRAINING PROGRAM

## 2024-06-13 PROCEDURE — 1036F TOBACCO NON-USER: CPT | Performed by: STUDENT IN AN ORGANIZED HEALTH CARE EDUCATION/TRAINING PROGRAM

## 2024-06-13 RX ORDER — AMINOPHYLLINE 25 MG/ML
125 INJECTION, SOLUTION INTRAVENOUS ONCE AS NEEDED
OUTPATIENT
Start: 2024-06-13

## 2024-06-13 RX ORDER — BIMATOPROST 0.1 MG/ML
1 SOLUTION/ DROPS OPHTHALMIC NIGHTLY
COMMUNITY
Start: 2024-06-05

## 2024-06-13 RX ORDER — REGADENOSON 0.08 MG/ML
0.4 INJECTION, SOLUTION INTRAVENOUS
OUTPATIENT
Start: 2024-06-13

## 2024-06-13 NOTE — PROGRESS NOTES
Cardiology Subsequent Encounter Clinic Note  Name: Debo Rodriguez  MRN: [id number]  : 1940  CC: Dyspnea with exertion    Active Issues:  Debo Rodriguez is a 83 y.o. femalewith a medical history of hypertension, hyperlipidemia, here for evaluation of the following complaints:     Problem #1 moderate aortic regurgitation  -Serial echocardiograms performed  and  showed likely moderate aortic regurgitation.     Problem #2 hypertension  -Normotensive in clinic today     On this visit the patient was up for the past few months she has been noticing worsening dyspnea with exertion.  It is limiting and makes her stop.  No clear chest pain.  Denies any orthopnea/PND.  Does not appear to have lower extremity edema      Past Medical History  Past Medical History:   Diagnosis Date    Allergic     Arthritis     Asymptomatic menopausal state     History of menopause    Chronic kidney disease     Diabetes mellitus (Multi)     Disease of thyroid gland     Encounter for full-term uncomplicated delivery ([facility])     Normal vaginal delivery    Encounter for gynecological examination (general) (routine) without abnormal findings     Pap test, as part of routine gynecological examination    Glaucoma     Guillain Barré syndrome (Multi)     Heart murmur     Hypertension     Inflammatory bowel disease     Other conditions influencing health status     Menstruation    Personal history of other medical treatment     H/O bone density study    Personal history of other medical treatment     History of mammogram    Scoliosis     Sleep apnea        Past Surgical History  Past Surgical History:   Procedure Laterality Date    EYE SURGERY      OTHER SURGICAL HISTORY  2021    Cataract surgery       Medications  Current Outpatient Medications on File Prior to Visit   Medication Sig Dispense Refill    amLODIPine (Norvasc) 5 mg tablet Take 1 tablet (5 mg) by mouth once daily. 90 tablet 1    ascorbic acid, vitamin C, 500 mg  "capsule Take by mouth.      aspirin 81 mg EC tablet Take 1 tablet (81 mg) by mouth once daily.      biotin 10 mg tablet Take 1 tablet (10 mg) by mouth once daily.      calcium carbonate-vitamin D3 600 mg-5 mcg (200 unit) tablet Take 1 tablet by mouth once daily.      carvedilol (Coreg) 6.25 mg tablet Take 1 tablet (6.25 mg) by mouth twice a day.      ibuprofen 200 mg tablet Take 1 tablet (200 mg) by mouth.      levothyroxine (Synthroid, Levoxyl) 75 mcg tablet Take 1 tablet (75 mcg) by mouth once daily.      lisinopril 20 mg tablet Take 1 tablet (20 mg) by mouth once daily at bedtime.      lisinopriL-hydrochlorothiazide 20-25 mg tablet Take 1 tablet by mouth once daily.      lovastatin (Mevacor) 20 mg tablet Take 1 tablet (20 mg) by mouth once daily at bedtime.      Lumigan 0.01 % ophthalmic solution Administer 1 drop into affected eye(s) once daily at bedtime.      magnesium 30 mg tablet Take 1 tablet (30 mg) by mouth twice a day.      metFORMIN XR (Glucophage-XR) 500 mg 24 hr tablet Take 1 tablet (500 mg) by mouth once daily with breakfast. Do not crush, chew, or split. 90 tablet 1    multivitamin tablet Take 1 tablet by mouth once daily.       No current facility-administered medications on file prior to visit.       Allergies  Allergies   Allergen Reactions    Erythromycin Anaphylaxis, Palpitations and Unknown    Flu Vaccine 2011 (36 Mos+)(Pf) Unknown    Sulfa (Sulfonamide Antibiotics) Other    Penicillins Hives and Unknown       Social History  Social History     Tobacco Use    Smoking status: Never    Smokeless tobacco: Never   Vaping Use    Vaping status: Never Used   Substance Use Topics    Alcohol use: Never    Drug use: Never       Family History  Family History   Problem Relation Name Age of Onset    Accidental death Father Ernesto Curtis        Physical Examination  Vitals: /64   Pulse 67   Ht 1.676 m (5' 6\")   Wt 65.7 kg (144 lb 14.4 oz)   SpO2 98%   BMI 23.39 kg/m²   General: awake, alert and " oriented. No acute distress.   Skin: Skin is warm, dry and intact without rashes or lesions. Appropriate color for ethnicity. Nail beds pink with no cyanosis or clubbing  HEENT: normocephalic, atraumatic; conjunctivae are clear without exudates or hemorrhage. Sclera is non-icteric. Eyelids are normal in appearance without swelling or lesions. Hearing intact. Nares are patent bilaterally. Moist mucous membranes.   Cardiovascular: Regular.  Soft systolic murmur; no gallops, or rubs are auscultated. S1 and S2 are heard and are of normal intensity. No JVD, no carotid bruits  Respiratory: Thorax symmetric. CTAB, breath sounds vesicular. No crackles, wheezes or ronchi.   Gastrointestinal: soft, non-distended, BS + x 4  Genitourinary: exam deferred  Musculoskeletal: moves all extremities  Extremities: pulses palpable bilaterally; no swelling or erythema; no edema  Neurological: alert & oriented x 3; no focal deficits  Psychiatric: appropriate mood and affect       Labs/Imaging/Procedures    Lab Results   Component Value Date    HGB 13.8 02/23/2024    HGB 13.3 01/31/2022     02/23/2024    WBC 6.9 02/23/2024     02/23/2024    K 4.0 02/23/2024    CREATININE 0.68 02/23/2024    CREATININE 0.75 01/31/2022    CREATININE 0.75 11/05/2021    BUN 19 02/23/2024    CALCIUM 10.1 02/23/2024     No echocardiogram results found for the past 12 months  ECG 12 lead (Clinic Performed)  EKG shows normal sinus rhythm with incomplete RBBB and no signs of ACS.    Echocardiogram January 2023:  CONCLUSIONS:   1. Left ventricular systolic function is normal with a 60% estimated ejection fraction.   2. Moderate aortic valve regurgitation.   3. No significant changes compared to prior echocardiogram dated 1/3/2022.       Impression  Debo Rodriguez is a 83 y.o. female with a medical history of hypertension, hyperlipidemia, here for evaluation of the following complaints:     Problem #1 moderate aortic regurgitation  Serial  echocardiograms and 22 and 2023 showed likely moderate aortic regurgitation     2. Hypertension  Normotensive in clinic today     Plan:  Given worsening dyspnea with exertion we will perform a regadenoson stress test and an echocardiogram.  The echocardiogram is to reassess the valvular lesion  -If the above tests are negative then the symptoms are likely attributable to age/deconditioning.  RTC 1 year      Jonathan Cabrera MD  Advanced Heart Failure/Transplant Cardiology  Cardio-Oncology  [facility]

## 2024-06-20 ENCOUNTER — PREP FOR PROCEDURE (OUTPATIENT)
Dept: OPERATING ROOM | Facility: HOSPITAL | Age: 84
End: 2024-06-20
Payer: MEDICARE

## 2024-06-20 DIAGNOSIS — H40.1122 PRIMARY OPEN ANGLE GLAUCOMA OF LEFT EYE, MODERATE STAGE: Primary | ICD-10-CM

## 2024-06-20 RX ORDER — POVIDONE-IODINE 5 %
SOLUTION, NON-ORAL OPHTHALMIC (EYE) ONCE
OUTPATIENT
Start: 2024-06-20 | End: 2024-06-20

## 2024-06-20 RX ORDER — TETRACAINE HYDROCHLORIDE 5 MG/ML
1 SOLUTION OPHTHALMIC ONCE
OUTPATIENT
Start: 2024-06-20 | End: 2024-06-20

## 2024-06-24 ENCOUNTER — APPOINTMENT (OUTPATIENT)
Dept: RADIOLOGY | Facility: HOSPITAL | Age: 84
End: 2024-06-24
Payer: MEDICARE

## 2024-06-24 ENCOUNTER — APPOINTMENT (OUTPATIENT)
Dept: CARDIOLOGY | Facility: HOSPITAL | Age: 84
End: 2024-06-24
Payer: MEDICARE

## 2024-06-26 ENCOUNTER — ANESTHESIA EVENT (OUTPATIENT)
Dept: OPERATING ROOM | Facility: HOSPITAL | Age: 84
End: 2024-06-26
Payer: MEDICARE

## 2024-06-27 ENCOUNTER — HOSPITAL ENCOUNTER (OUTPATIENT)
Facility: HOSPITAL | Age: 84
Setting detail: OUTPATIENT SURGERY
Discharge: HOME | End: 2024-06-27
Attending: OPHTHALMOLOGY | Admitting: OPHTHALMOLOGY
Payer: MEDICARE

## 2024-06-27 ENCOUNTER — ANESTHESIA (OUTPATIENT)
Dept: OPERATING ROOM | Facility: HOSPITAL | Age: 84
End: 2024-06-27
Payer: MEDICARE

## 2024-06-27 VITALS
OXYGEN SATURATION: 95 % | BODY MASS INDEX: 23.3 KG/M2 | TEMPERATURE: 97.3 F | SYSTOLIC BLOOD PRESSURE: 109 MMHG | HEART RATE: 64 BPM | DIASTOLIC BLOOD PRESSURE: 52 MMHG | HEIGHT: 66 IN | WEIGHT: 145 LBS | RESPIRATION RATE: 17 BRPM

## 2024-06-27 PROBLEM — R01.1 MURMUR: Status: ACTIVE | Noted: 2024-06-27

## 2024-06-27 PROBLEM — I71.9 AORTIC ANEURYSM (CMS-HCC): Status: ACTIVE | Noted: 2024-06-27

## 2024-06-27 PROBLEM — N18.9 CHRONIC RENAL INSUFFICIENCY: Status: ACTIVE | Noted: 2024-06-27

## 2024-06-27 LAB — GLUCOSE BLD MANUAL STRIP-MCNC: 144 MG/DL (ref 74–99)

## 2024-06-27 PROCEDURE — 3600000008 HC OR TIME - EACH INCREMENTAL 1 MINUTE - PROCEDURE LEVEL THREE: Performed by: OPHTHALMOLOGY

## 2024-06-27 PROCEDURE — 3700000002 HC GENERAL ANESTHESIA TIME - EACH INCREMENTAL 1 MINUTE: Performed by: OPHTHALMOLOGY

## 2024-06-27 PROCEDURE — 7100000010 HC PHASE TWO TIME - EACH INCREMENTAL 1 MINUTE: Performed by: OPHTHALMOLOGY

## 2024-06-27 PROCEDURE — 2500000004 HC RX 250 GENERAL PHARMACY W/ HCPCS (ALT 636 FOR OP/ED): Performed by: ANESTHESIOLOGY

## 2024-06-27 PROCEDURE — 3700000001 HC GENERAL ANESTHESIA TIME - INITIAL BASE CHARGE: Performed by: OPHTHALMOLOGY

## 2024-06-27 PROCEDURE — 2500000005 HC RX 250 GENERAL PHARMACY W/O HCPCS: Performed by: OPHTHALMOLOGY

## 2024-06-27 PROCEDURE — 82947 ASSAY GLUCOSE BLOOD QUANT: CPT

## 2024-06-27 PROCEDURE — 2721000 HC OR 272 NO HCPCS: Performed by: OPHTHALMOLOGY

## 2024-06-27 PROCEDURE — 3600000003 HC OR TIME - INITIAL BASE CHARGE - PROCEDURE LEVEL THREE: Performed by: OPHTHALMOLOGY

## 2024-06-27 PROCEDURE — 7100000009 HC PHASE TWO TIME - INITIAL BASE CHARGE: Performed by: OPHTHALMOLOGY

## 2024-06-27 RX ORDER — LIDOCAINE HYDROCHLORIDE AND EPINEPHRINE 10; 10 MG/ML; UG/ML
INJECTION, SOLUTION INFILTRATION; PERINEURAL AS NEEDED
Status: DISCONTINUED | OUTPATIENT
Start: 2024-06-27 | End: 2024-06-27 | Stop reason: HOSPADM

## 2024-06-27 RX ORDER — TETRACAINE HYDROCHLORIDE 5 MG/ML
1 SOLUTION OPHTHALMIC ONCE
Status: DISCONTINUED | OUTPATIENT
Start: 2024-06-27 | End: 2024-06-27 | Stop reason: HOSPADM

## 2024-06-27 RX ORDER — POVIDONE-IODINE 5 %
SOLUTION, NON-ORAL OPHTHALMIC (EYE) ONCE
Status: DISCONTINUED | OUTPATIENT
Start: 2024-06-27 | End: 2024-06-27 | Stop reason: HOSPADM

## 2024-06-27 RX ORDER — MIDAZOLAM HYDROCHLORIDE 1 MG/ML
1 INJECTION INTRAMUSCULAR; INTRAVENOUS ONCE
Status: COMPLETED | OUTPATIENT
Start: 2024-06-27 | End: 2024-06-27

## 2024-06-27 RX ORDER — SODIUM CHLORIDE, SODIUM LACTATE, POTASSIUM CHLORIDE, CALCIUM CHLORIDE 600; 310; 30; 20 MG/100ML; MG/100ML; MG/100ML; MG/100ML
50 INJECTION, SOLUTION INTRAVENOUS CONTINUOUS
Status: DISCONTINUED | OUTPATIENT
Start: 2024-06-27 | End: 2024-06-27 | Stop reason: HOSPADM

## 2024-06-27 RX ORDER — ONDANSETRON HYDROCHLORIDE 2 MG/ML
4 INJECTION, SOLUTION INTRAVENOUS ONCE
Status: COMPLETED | OUTPATIENT
Start: 2024-06-27 | End: 2024-06-27

## 2024-06-27 RX ORDER — FENTANYL CITRATE 50 UG/ML
INJECTION, SOLUTION INTRAMUSCULAR; INTRAVENOUS AS NEEDED
Status: DISCONTINUED | OUTPATIENT
Start: 2024-06-27 | End: 2024-06-27

## 2024-06-27 RX ORDER — MIDAZOLAM HYDROCHLORIDE 2 MG/2ML
INJECTION, SOLUTION INTRAMUSCULAR; INTRAVENOUS AS NEEDED
Status: DISCONTINUED | OUTPATIENT
Start: 2024-06-27 | End: 2024-06-27

## 2024-06-27 SDOH — HEALTH STABILITY: MENTAL HEALTH: CURRENT SMOKER: 0

## 2024-06-27 ASSESSMENT — PAIN SCALES - GENERAL
PAIN_LEVEL: 0
PAINLEVEL_OUTOF10: 0 - NO PAIN
PAINLEVEL_OUTOF10: 0 - NO PAIN

## 2024-06-27 ASSESSMENT — PAIN - FUNCTIONAL ASSESSMENT: PAIN_FUNCTIONAL_ASSESSMENT: 0-10

## 2024-06-27 NOTE — H&P
H&P Notes  - documented in this encounter  Meet Moreira MD - 06/19/2024 9:53 AM EDT  Formatting of this note is different from the original.  HISTORY AND PHYSICAL EXAMINATION    SERVICE DATE: 6/19/2024  SERVICE TIME: 9:55 AM    PRIMARY CARE PHYSICIAN: Rizwana Daley MD    REASON FOR VISIT:  Debo Rodriguez is a 83 year old female who is being seen for Primary open angle glaucoma moderate stage left eye    The patient has the following:  ACTIVE PROBLEM LIST  Guillain Barré Syndrome (Hcc)  Essential Hypertension  Bilateral Ocular Hypertension  Meibomian Gland Dysfunction (Mgd) of Upper and Lower Lids of Both Eyes  Hordeolum Internum of Right Upper Eyelid  Chalazion of Right Upper Eyelid  Chalazion of Left Upper Eyelid  Pseudophakia, Both Eyes  Borderline Steroid-Induced Glaucoma of Both Eyes  Optic Cupping of Both Eyes  Primary Open Angle Glaucoma (Poag) of Right Eye, Moderate Stage  Primary Open Angle Glaucoma (Poag) of Left Eye, Moderate Stage    SUBJECTIVE  CHIEF COMPLAINT: Primary open angle glaucoma moderate stage left eye    PAST MEDICAL HISTORY  Diagnosis Date  Guillain Barré syndrome (HCC)  Secondary diabetes, resolved  Guillain Barré syndrome (HCC) 3/22/2016  Heart murmur  HLD (hyperlipidemia)  Hypertension  Palpitations    PAST SURGICAL HISTORY  Procedure Laterality Date  CARPAL TUNNEL  PAST SURGICAL HISTORY OF 02/2022  removal of Polyps from [place]us  XCAPSL CTRC RMVL INSJ IO LENS PROSTH W/O ECP Right 03/14/2019  Toric IOL  XCAPSL CTRC RMVL INSJ IO LENS PROSTH W/O ECP Left 03/27/2019  Cataract Extraction with TORIC IOL    FAMILY HISTORY  Adopted: Yes    SOCIAL HISTORY:  Social History    Tobacco Use  Smoking status: Never  Smokeless tobacco: Never  Substance Use Topics  Alcohol use: No  Drug use: No    MEDICATIONS:  Prior to Admission medications as of 6/19/24 0927  Medication Sig Last Dose Taking  metFORMIN (GLUCOPHAGE) 500 mg tablet Take 500 mg by mouth daily with breakfast. Taking  Yes  bimatoprost (LUMIGAN) 0.01 % drop ophthalmic drops Use 1 Drop in both eyes daily at bedtime. Use 1 drop at bedtime in both eyes at 10:30PM Taking Yes  bacitracin ophthalmic ophthalmic ointment Use 1 application in both eyes daily at bedtime. Taking Yes  lisinopril-hydroCHLOROthiazide (ZESTORETIC) 20-25 mg per tablet Take 1 tablet by mouth once daily. Taking Yes  carvedilol (COREG) 6.25 mg tablet Take 1 tablet by mouth twice daily. Taking Yes  amLODIPine (NORVASC) 5 mg tablet Take 1 tablet by mouth q 24 HR. Taking Yes  SENNA LAXATIVE 8.6 mg tab Take 1 tablet by mouth twice daily. Taking Yes  cholecalciferol (VITAMIN D3) 5,000 unit tab Take 5,000 Units by mouth. Taking Yes  propylene glycoL (SYSTANE COMPLETE) 0.6 % drop Use 1 Drop in both eyes three times daily. Taking Yes  aspirin, enteric coated (ADULT LOW DOSE ASPIRIN) 81 mg EC tablet Take 1 tablet by mouth once daily. Taking Yes  multivitamin (DAILY VITAMIN) tablet Take 1 tablet by mouth once daily. Taking Yes  lovastatin (MEVACOR) 20 mg tablet Take 20 mg by mouth daily at bedtime. Taking Yes  levothyroxine (SYNTHROID) 75 mcg tablet Take 75 mcg by mouth daily before breakfast. Taking Yes  fluorometholone (FML LIQUID FILM) 0.1 % ophthalmic suspension Use 1 Drop in the left eye four times daily.  ergocalciferol 50,000 unit capsule (VITAMIN D2, DRISDOL) Take 50,000 Units by mouth one time a week.    No medication comments found.    CURRENT ALLERGIES:  ALLERGIES  Allergen Reactions  Erthromycin [Erythr* Anaphylaxis  Flu Vaccine 2011 (3* Other: See Comments  Penicillin Hives  Sulfa (Sulfonamide * Hives  Zithromax [Azithrom* Anaphylaxis    REVIEW OF SYSTEMS:  PAIN ASSESSMENT:  General: No weight loss, malaise or fevers.  Neuro: No Hx of stroke or seizures  Respiratory: No history of current cough or dyspnea, or pneumonia in the past 6 weeks. No history of respiratory/pulmonary symptoms or problems  Cardiovascular: Positive for: Hypertension  GI: No history of GI  symptoms or problems. No history of esophageal varices, recent ascites, or ETOH greater than 2 drinks per day.  : No history of UTI in past 6 weeks. No history of renal failure. Not currently on or requiring dialysis. No history of  symptoms or problems.  GYN: Negative for abnormal vaginal bleeding, abnormal vaginal discharge.  Pregnancy: N/A  Endocrine: Diabetes Mellitus on oral agent  Hematology: No history of bleeding or clotting disorder. Pt is not taking anti-coagulation or platelet medications. No history of hematological symptoms or problems.  Oncology: No history of CA metastasis, chemo within 30 days, or radiotherapy within 90 days. Has not lost 10% of body wt in 6 months. No history of oncological symptoms or problems.  Psych: No history of psychiatric symptoms or problems.  Musculoskeletal: Negative for joint pain or swelling, back pain or muscle pain.  Skin: Negative for lesions, rash and itching.    PHYSICAL EXAM:  VITALS:  /72  Pulse 73        General: Alert and oriented  Skin: Normal color, no rash, no lesions.  HEENT: EOM, pupils equal, round and reactive.  Cardiovascular: Normal S1 & S2, no rubs, murmurs or gallops. No JVD. Pulse regular.  Lungs: Normal breath sounds, no wheezes or crackles.  Abdomen: Soft, non-tender, no rigidity.  Extremities: No deformity, no edema or tenderness, no joint swelling or clubbing.  Neurological: Normal cognition and motor skills.  Pulses: Carotid and radial pulses normal +2.    Diagnostic tests reviewed for today's visit:  No new labs    ASSESSMENT  Medication and Non-Pharmacologic VTE Prophylaxis/Anticoagulants      VTE Prophylaxis: VTE prophylaxis appropriate    Impression: There is no known pertinent medical condition which may affect cory-operative course    Clinical Risk Factors for Possible Cardiac Complications:  None    Patient is scheduled for a low-risk procedure.    FUNCTIONAL STATUS: Walk indoors, such as around the house (1.75 METs)  Do light  work around the house, such as dusting or washing dishes (2.70 METs)  Take care of self, that is eating, dressing, bathing, using the toilet (2.75 METs)    Functional Class (NYHA): N/A    [employer]: Not obtained    PLAN  CONSULTS:  Patient does not require consults for optimization at this time.    The Following Tests/Procedures Have Been Initiated:  None    Instructions Given to Patient:  Patient given verbal and written preop instructions and voices comprehension and compliance.    SIGNATURE: Meet Moreira MD PATIENT NAME: Debo Rodriguez  DATE: June 19, 2024 MRN: [id number]  TIME: 9:53 AM PAGER/CONTACT #:    Electronically signed by Meet Moreira MD at 06/19/2024 9:57 AM EDT   Source Comments  - [facility]  In the event this information is protected by the Federal Confidentiality of Alcohol and Drug Abuse Patient Records regulations: This information has been disclosed to you from records protected by Federal confidentiality rules (42 CFR Part 2). The Federal rules prohibit you from making any further disclosure of this information unless further disclosure is expressly permitted by the written consent of the person to whom it pertains or as otherwise permitted by 42 CFR Part 2. A general authorization for the release of medical or other information is NOT sufficient for this purpose. The Federal rules restrict any use of the information to criminally investigate or prosecute any alcohol or drug abuse patient.  Reason for Visit    Reason for Visit -  Reason Comments   Primary Open Angle Glaucoma Follow Up       Encounter Details    Encounter Details  Date Type Department Care Team (Latest Contact Info) Description   06/19/2024 9:30 AM EDT Office Visit [facility] Ophthalmology   [address] [place] [address]   [phone]  Meet Moreira MD   [address] [place] [address]   [phone] (Work)   [phone] (Fax)  Primary open angle glaucoma (POAG) of left eye, moderate stage (Primary  Dx);  Primary open angle glaucoma (POAG) of right eye, moderate stage;  Optic cupping of both eyes;  Meibomian gland dysfunction (MGD) of upper and lower lids of both eyes;  Type 2 diabetes mellitus without retinopathy (HCC);  Pseudophakia, both eyes;  Essential hypertension     Social History  - documented as of this encounter  Social History  Tobacco Use Types Packs/Day Years Used Date   Smoking Tobacco: Never           Smokeless Tobacco: Never             Social History  Alcohol Use Standard Drinks/Week Comments   No 0 (1 standard drink = 0.6 oz pure alcohol)       Social History  Area Deprivation Index Answer Date Recorded   National Score (1-100), lower number is lower risk 61 06/12/2024   State Score (1-10), lower number is lower risk 4 06/12/2024   Data from: https://www.neighborhoodatlas.[facility].edu/. Last address used for calculation [address] [place] [address] 06/12/2024     Social History  Sex and Gender Information Value Date Recorded   Sex Assigned at Birth Not on file     Gender Identity Not on file     Sexual Orientation Not on file       Last Filed Vital Signs  - documented in this encounter  Last Filed Vital Signs  Vital Sign Reading Time Taken Comments   Blood Pressure 162/72 06/19/2024 9:25 AM EDT     Pulse 73 06/19/2024 9:25 AM EDT     Temperature - -     Respiratory Rate - -     Oxygen Saturation - -     Inhaled Oxygen Concentration - -     Weight - -     Height - -     Body Mass Index - -       Functional Status  - documented as of this encounter  Functional Status  Functional Status Response Date of Assessment   Are you deaf or do you have serious difficulty hearing? No 03/19/2015   Are you blind or do you have serious difficulty seeing, even when wearing glasses? No 03/19/2015   Do you have serious difficulty walking or climbing stairs? No 03/19/2015   Do you have difficulty dressing or bathing? No 03/19/2015   Because of a physical, mental, or emotional condition, do you have difficulty doing  errands alone such as visiting a doctor's office or shopping? No 03/19/2015     Functional Status  Cognitive Status Response Date of Assessment   Because of a physical, mental, or emotional condition, do you have serious difficulty concentrating, remembering, or making decisions? No 03/19/2015     Patient Instructions  - documented in this encounter  Patient Instructions  Meet Moreira MD - 06/19/2024 9:31 AM EDT   Formatting of this note is different from the original.  The nature of glaucoma was discussed, with emphasis on the non-reversible damage to the optic nerve. Treatment options and the importance of regular examinations and testing were covered in detail, as well as the consequences of non-compliance. The patient was given the opportunity to ask questions.    Current Ophthalmic Meds        bimatoprost (LUMIGAN) 0.01 % drop ophthalmic drops  Use 1 Drop in both eyes daily at bedtime. Use 1 drop at bedtime in both eyes at 10:30PM  bacitracin ophthalmic ophthalmic ointment  Use 1 application in both eyes daily at bedtime.  propylene glycoL (SYSTANE COMPLETE) 0.6 % drop Use 1 Drop in both eyes three times daily.    Bring ketorolac and fluorometholone to surgery unopened    Please keep your blood sugar under good control to minimize risk of ocular complications from diabetes.    If you have any questions please contact our office at [phone].  After office hours or on the weekend, please call Dr. Moreira on his cell phone at [phone].    Electronically signed by Meet Moreira MD at 06/19/2024 9:53 AM EDT     Ordered Prescriptions  - documented in this encounterReconcile with Patient's Chart  Ordered Prescriptions  Prescription Sig Dispensed Refills Start Date End Date   fluorometholone (FML LIQUID FILM) 0.1 % ophthalmic suspension  Use 1 Drop in the left eye four times daily. 5 mL  1 06/19/2024       Progress Notes  - documented in this encounter  Meet Moreira MD - 06/19/2024 9:27 AM  EDT  Formatting of this note is different from the original.  ASSESSMENT/PLAN:  1. Primary open angle glaucoma (POAG) of left eye, moderate stage - ICD9: 365.11, 365.72, ICD10: H40.1122 (primary diagnosis)  - Canaloplasty with iTrack advance left eye is scheduled on 6/27/24    Target Intraocular pressure: 14-16 mmHg  Due to the following reason/s:  Intraocular pressure not at desired target pressure with topical medications  An inability to comply with Glaucoma eye drop requirements  Patient reported that the side effects of the medications are negatively impacting the patient's quality of life  Failed medical management    Patient to bring Acular and FML to surgery unopened    2. Primary open angle glaucoma (POAG) of right eye, moderate stage - ICD9: 365.11, 365.72, ICD10: H40.1112    The nature of glaucoma was discussed, with emphasis on the non-reversible damage to the optic nerve. Treatment options and the importance of regular examinations and testing were covered in detail, as well as the consequences of non-compliance. The patient was given the opportunity to ask questions.    Current Ophthalmic Meds        bimatoprost (LUMIGAN) 0.01 % drop ophthalmic drops Use 1 Drop in both eyes daily at bedtime. Use 1 drop at bedtime in both eyes at 10:30PM    3. Optic cupping of both eyes - ICD9: 377.14, ICD10: H47.233  - Stable / Monitor    4. Meibomian gland dysfunction (MGD) of upper and lower lids of both eyes - ICD9: 373.00, ICD10: H02.88A, H02.88B  - Continue  Current Ophthalmic Meds        bacitracin ophthalmic ophthalmic ointment Use 1 application in both eyes daily at bedtime.  propylene glycoL (SYSTANE COMPLETE) 0.6 % drop Use 1 Drop in both eyes three times daily.    5. Type 2 diabetes mellitus without retinopathy (HCC) - ICD9: 250.00, ICD10: E11.9  - Please keep your blood sugar under good control to minimize risk of ocular complications from diabetes.  - Continue to monitor with PCP    6. Pseudophakia, both  eyes - ICD9: V43.1, ICD10: Z96.1  - Intraocular lens in good position both eyes    7. Essential hypertension - ICD9: 401.9, ICD10: I10  - Continue to monitor with PCP    I have confirmed and edited as necessary the relevant HPI, ophthalmic history, ROS, and the neuro exam findings as obtained by others. I have seen and examined Debo Rodriguez.  I have discussed the case and the management of this patient's care with the Resident/Fellow, if applicable. I also have reviewed and agree with the assessment and plan as stated above and agree with all of its relevant components.        Electronically signed by Meet Moreira MD at 06/19/2024 9:57 AM EDT   Plan of Treatment  - documented as of this encounter  Plan of Treatment - Upcoming Encounters  Upcoming Encounters  Date Type Department Care Team (Latest Contact Info) Description   06/28/2024 8:15 AM EDT Office Visit [facility] Ophthalmology   [address] [place] [address]   [phone]  Meet Moreira MD   [address] [place] [address]   [phone] (Work)   [phone] (Fax)  1 day po [address]st [address]     Plan of Treatment - Scheduled Procedures  Scheduled Procedures  Name Priority Associated Diagnoses Date/Time   SURGERY AT NON-[facility] [place], operated by [facility]   Primary open angle glaucoma (POAG) of left eye, moderate stage  06/27/2024 11:05 AM EDT     Visit Diagnoses  - documented in this encounter  Visit Diagnoses  Diagnosis   Primary open angle glaucoma (POAG) of left eye, moderate stage - Primary    Primary open angle glaucoma (POAG) of right eye, moderate stage    Optic cupping of both eyes    Meibomian gland dysfunction (MGD) of upper and lower lids of both eyes    Type 2 diabetes mellitus without retinopathy (HCC)   Type II or unspecified type diabetes mellitus without mention of complication, not stated as uncontrolled    Pseudophakia, both eyes   Lens replaced by other means    Essential hypertension   Unspecified essential hypertension      Eye Exam    Eye  Exam - Visual Acuity (Snellen - Linear)  Visual Acuity (Snellen - Linear)    Right eye Left eye   Dist cc 20/30 20/25     Eye Exam  Correction: Glasses     Eye Exam - Tonometry (Applanation, 9:44 AM)  Tonometry (Applanation, 9:44 AM)    Right eye Left eye   Pressure 19 19.5     Eye Exam - Target Pressure  Target Pressure    Right eye Left eye   Target 14-16 14-16     Eye Exam - Pupils  Pupils    Pupils Dark Shape React APD   Right eye PERRL 4 Round +2 None   Left eye PERRL 4 Round +2 None     Eye Exam - Visual Fields (Counting fingers)  Visual Fields (Counting fingers)    Right eye Left eye     Full Full     Eye Exam - Extraocular Movement  Extraocular Movement    Right eye Left eye     Full Full     Eye Exam - Neuro/Psych  Neuro/Psych  Oriented x3: Yes   Mood/Affect: Normal     Eye Exam - External Exam  External Exam    Right eye Left eye   External Normal including orbits and preauricular lymph nodes Normal including orbits and preauricular lymph nodes     Eye Exam - Slit Lamp Exam  Slit Lamp Exam    Right eye Left eye   Lids/Lashes Meibomian gland dysfunction resolving, Lower lid hordeolum Meibomian gland dysfunction resolving   Conjunctiva/Sclera White and quiet White and quiet   Cornea Normal epithelium, stroma, endothelium, and tear film Normal epithelium, stroma, endothelium, and tear film   Anterior Chamber Deep and quiet Deep and quiet   Iris Round and reactive Round and reactive   Lens Posterior chamber intraocular lens, PC Intact, clear Posterior chamber intraocular lens, PC Clear   Anterior Vitreous Vitreous floaters Vitreous floaters     Eye Exam - Fundus Exam  Fundus Exam    Right eye Left eye   Disc Normal Notching at 5 o'clock   C/D Ratio 0.35 0.4 x 0.3   Macula Normal Retinal pigment epithelial mottling   Vessels Normal Normal   Periphery Degeneration Degeneration     Eye Exam - Wearing Rx  Wearing Rx    Sphere Cylinder Axis Add   Right eye -0.50 +0.25 102 +2.50   Left eye +0.25 +0.00 000 +2.50      Care Teams  - documented as of this encounter  Care Teams  Team Member Relationship Specialty Start Date End Date   Rizwana Daley MD   NPI: [id number]   [address] [place] [address]   [phone] (Work)   [phone] (Fax)  PCP - General Family Medicine 5/6/24     Ralph Carranza MD   NPI: [id number]   [phone] (Work)  Primary Staff Physician Cardiology 3/19/19

## 2024-06-27 NOTE — OP NOTE
Canaloplasty Eye W/ITRACK (L) Operative Note     Date: 2024  OR Location: [facility] OR    Name: Debo Rodriguez, : 1940, Age: 83 y.o., MRN: [id number], Sex: female    Diagnosis  Pre-op Diagnosis     * Primary open angle glaucoma of left eye, moderate stage [H40.1122] Post-op Diagnosis     * Primary open angle glaucoma of left eye, moderate stage [H40.1122]     Procedures  Canaloplasty Eye W/ITRACK  54689 - MI TRLUML DILAT AQUEOUS O/F CAN WO RETENTION DEV/ST      Surgeons      * Meet Moreira - Primary    Resident/Fellow/Other Assistant:  Surgeons and Role:  * No surgeons found with a matching role *    Procedure Summary  Anesthesia: Monitor Anesthesia Care  ASA: III  Anesthesia Staff: Anesthesiologist: Jelani Larson DO  Estimated Blood Loss: None  Intra-op Medications:   Administrations occurring from 0700 to 0730 on 24:   Medication Name Total Dose   lidocaine-epinephrine (Xylocaine W/EPI) 1 %-1:100,000 injection 4 mL   lactated Ringer's infusion Cannot be calculated     Anesthesia Record          Intraprocedure I/O Totals          Intake    Propofol Drip 0.00 mL    The total shown is the total volume documented since Anesthesia Start was filed.    lactated Ringer's infusion 350.00 mL    Total Intake 350 mL     Specimen: No specimens collected     Staff:   Circulator: Ankush Howard Person: Edwin    Drains and/or Catheters: * None in log *        Findings: Primary Open Angle Glaucoma Left Eye, Moderate Stage     Indications: Debo Rodriguez is an 83 y.o. female who is having surgery for Primary open angle glaucoma of left eye, moderate stage [H40.1122]. Intraocular pressure not at desired target. Side effects of eye medications are negatively impacting patients quality of life. Inability to comply with eye medication requirements.    The patient was seen in the preoperative area. The risks, benefits, complications, treatment options, non-operative alternatives, expected recovery and outcomes were  discussed with the patient. The possibilities of reaction to medication, pulmonary aspiration, injury to surrounding structures, bleeding, recurrent infection, the need for additional procedures, failure to diagnose a condition, and creating a complication requiring transfusion or operation were discussed with the patient. The patient concurred with the proposed plan, giving informed consent.  The site of surgery was properly noted/marked if necessary per policy. The patient has been actively warmed in preoperative area. Preoperative antibiotics are not indicated. Venous thrombosis prophylaxis are not indicated.    Procedure Details:  The patient was correctly identified in the preop area and the operative eye was marked with a marking pen. The patient was then taken to the operating room where timeout was performed before starting the procedure. Combined anesthesia  with intravenous sedation and topical tetracaine eyedrops were given the left eye. The operative eye was prepped and draped in the standard sterile ophthalmic fashion in  preparation for ophthalmic surgery.  A Tia wire speculum was then inserted between the eyelids of the left eye and the operating microscope was placed over the left eye.  A near limbal clear corneal incision was fashioned in the temporal quadrant just outside the vascular arcade and Viscoat was injected into anterior chamber to firm the eye.     The patients head was rotated to the right side and the microscope was rotated to the left eye.  Using the gonioprism the nasal trabecular meshwork was brought into clear view.       Viscoat was used to inflate the nasal angle as well as the anterior chamber.  A small amount of viscoat was placed on the cornea.  The iTrack Advance cannula was inserted into the anterior chamber and was used to perform a nasal goniotomy.  Through this incision, the iTrack catheter was advanced into the Schlemm's Canal by advancing the actuator and the  catheter navigated 360 degrees through Schlemm's canal.  Upon withdrawal of the iTrack catheter, provisc was injected into Schlemm;s canal allowing for vasodilation and focal disruptions of the trabecular platelets to allow better outflow of aqueous.  Approximately 9 clicks of viscoelastic per 3 clock hours in 360 degrees was placed inside Schlemm's canal.  The iTrack catheter was then removed from the eye and the patients head was rotated back to the neutral position.  An I & A (irrigation & aspiration) handpiece was then used to remove the Viscoat from the anterior chamber was well as any blood products that may have collected during the viscoanalostomy.      Vigamox was then injected into the anterior chamber and into the capsule bag through the paracentesis incision. The surgical wound was then inspected and found to be watertight.  The wire speculum and drapes were then removed.  FML eyedrops, Acular eyedrops, Pilocar 1% eye drops and Betadine 5% sterile ophthalmic solution were instilled in the conjunctival sac. Maxitrol ointment was instilled and a patch and shield were applied.      The patient tolerated the procedure well and was taken to recovery room in stable condition.    Complications:  None; patient tolerated the procedure well.    Disposition:  Home  Condition: stable     Attending Attestation: I performed the procedure.    Meet Moreira  Phone Number: [phone]

## 2024-06-27 NOTE — DISCHARGE INSTRUCTIONS
Please see enclosed instructions from Dr. Moreira regarding eye drop schedule, restrictions and use of eye shield.    Please take bag with eye drops that were given to you today as well as ALL eye drops that you are using at home with you to your appointment tomorrow at Dr. Moreira's office.

## 2024-06-27 NOTE — ANESTHESIA POSTPROCEDURE EVALUATION
Patient: Debo Rodriguez    Procedure Summary       Date: 06/27/24 Room / Location: [facility] OR  / [facility]    Anesthesia Start: 0659 Anesthesia Stop: 0732    Procedure: Canaloplasty Eye W/ITRACK (Left: Eye) Diagnosis:       Primary open angle glaucoma of left eye, moderate stage      (Primary open angle glaucoma of left eye, moderate stage [H40.1122])    Surgeons: Meet Moreira MD Responsible Provider: Jelani Larson DO    Anesthesia Type: MAC ASA Status: 3            Anesthesia Type: MAC    Vitals Value Taken Time   /52 06/27/24 0735   Temp 97.3 06/27/24 0735   Pulse 64 06/27/24 0735   Resp 16 06/27/24 0735   SpO2 96 RA 06/27/24 0735       Anesthesia Post Evaluation    Patient location during evaluation: bedside  Patient participation: complete - patient participated  Level of consciousness: awake  Pain score: 0  Pain management: adequate  Multimodal analgesia pain management approach  Airway patency: patent  Cardiovascular status: acceptable  Respiratory status: acceptable  Hydration status: acceptable  Postoperative Nausea and Vomiting: none  Comments: Easily awakens.  VSS.  Denies pain or nausea.  Comfortable.      Thankful for care provided.  No notable events documented.

## 2024-06-27 NOTE — ANESTHESIA PREPROCEDURE EVALUATION
Patient: Debo Rodriguez    Procedure Information       Date/Time: 06/27/24 0700    Procedure: Canaloplasty Eye W/ITRACK (Left: Eye)    Location: [facility] OR  / [facility] OR    Surgeons: Meet Moreira MD            Relevant Problems   Anesthesia   (-) Family history of malignant hyperthermia   (-) Malignant hyperthermia   (-) PONV (postoperative nausea and vomiting)      Cardiac  Moderate Aortic regurg   (+) Essential hypertension   (+) Hyperlipidemia   (+) Murmur      Pulmonary  JOVANNA compliant with CPAP   (+) Obstructive sleep apnea syndrome      Neuro (within normal limits)      GI   (+) Gastroesophageal reflux disease      /Renal   (+) Chronic renal insufficiency      Liver (within normal limits)      Endocrine   (+) Hypothyroidism (acquired)   (+) Type 2 diabetes mellitus (Multi)      Musculoskeletal (within normal limits)      HEENT   (+) Primary open angle glaucoma of left eye, moderate stage       Clinical information reviewed:   Tobacco  Allergies  Meds   Med Hx  Surg Hx   Fam Hx  Soc Hx        NPO Detail:  NPO/Void Status  Carbohydrate Drink Given Prior to Surgery? : N  Date of Last Liquid: 06/27/24  Time of Last Liquid: 0520  Date of Last Solid: 06/26/24  Time of Last Solid: 2200  Last Intake Type: Clear fluids  Time of Last Void: 0615         Physical Exam    Airway  Mallampati: II  TM distance: >3 FB  Neck ROM: full     Cardiovascular   Rhythm: regular  Rate: normal     Dental   Comments: Intact,  Bridge fastened.   Pulmonary - normal exam     Abdominal            Anesthesia Plan    History of general anesthesia?: yes  History of complications of general anesthesia?: no    ASA 3     MAC     The patient is not a current smoker.    intravenous induction   Anesthetic plan and risks discussed with patient.    MAC discussed with Patient.  Plan and process discussed for anesthesia for procedure.  Risks and benefits discussed.  Need for cooperation with the surgeon for the block per surgeon and  procedure. All questions answered with patient.  The patient understands plan and wishes to proceed.

## 2024-07-02 ENCOUNTER — TELEPHONE (OUTPATIENT)
Dept: PRIMARY CARE | Facility: CLINIC | Age: 84
End: 2024-07-02
Payer: MEDICARE

## 2024-07-02 DIAGNOSIS — I10 ESSENTIAL HYPERTENSION: ICD-10-CM

## 2024-07-02 DIAGNOSIS — E11.9 TYPE 2 DIABETES MELLITUS WITHOUT COMPLICATION, WITHOUT LONG-TERM CURRENT USE OF INSULIN (MULTI): Primary | ICD-10-CM

## 2024-07-02 DIAGNOSIS — E03.9 HYPOTHYROIDISM (ACQUIRED): ICD-10-CM

## 2024-07-02 DIAGNOSIS — E78.5 HYPERLIPIDEMIA, UNSPECIFIED HYPERLIPIDEMIA TYPE: ICD-10-CM

## 2024-07-03 ENCOUNTER — HOSPITAL ENCOUNTER (OUTPATIENT)
Dept: CARDIOLOGY | Facility: HOSPITAL | Age: 84
Discharge: HOME | End: 2024-07-03
Payer: MEDICARE

## 2024-07-03 DIAGNOSIS — I35.1 MODERATE AORTIC REGURGITATION: ICD-10-CM

## 2024-07-03 DIAGNOSIS — R06.02 SHORTNESS OF BREATH: ICD-10-CM

## 2024-07-03 LAB
AORTIC VALVE MEAN GRADIENT: 4 MMHG
AORTIC VALVE PEAK VELOCITY: 1.52 M/S
AV PEAK GRADIENT: 9.2 MMHG
AVA (PEAK VEL): 1.93 CM2
AVA (VTI): 2.1 CM2
EJECTION FRACTION APICAL 4 CHAMBER: 63.4
EJECTION FRACTION: 64 %
LEFT ATRIUM VOLUME AREA LENGTH INDEX BSA: 14.3 ML/M2
LEFT VENTRICLE INTERNAL DIMENSION DIASTOLE: 4.26 CM (ref 3.5–6)
LEFT VENTRICULAR OUTFLOW TRACT DIAMETER: 1.8 CM
LV EJECTION FRACTION BIPLANE: 64 %
MITRAL VALVE E/A RATIO: 0.71
RIGHT VENTRICLE FREE WALL PEAK S': 13.8 CM/S
RIGHT VENTRICLE PEAK SYSTOLIC PRESSURE: 21.5 MMHG
TRICUSPID ANNULAR PLANE SYSTOLIC EXCURSION: 2 CM

## 2024-07-03 PROCEDURE — 93306 TTE W/DOPPLER COMPLETE: CPT

## 2024-07-03 PROCEDURE — 93306 TTE W/DOPPLER COMPLETE: CPT | Performed by: STUDENT IN AN ORGANIZED HEALTH CARE EDUCATION/TRAINING PROGRAM

## 2024-07-09 ENCOUNTER — TELEPHONE (OUTPATIENT)
Dept: CARDIOLOGY | Facility: CLINIC | Age: 84
End: 2024-07-09
Payer: MEDICARE

## 2024-07-09 NOTE — TELEPHONE ENCOUNTER
----- Message from Jonathan Cabrera MD sent at 7/9/2024 10:30 AM EDT -----  Echocardiogram appears stable from the 1 performed last year.  No action required.

## 2024-07-22 ENCOUNTER — TELEPHONE (OUTPATIENT)
Dept: PRIMARY CARE | Facility: CLINIC | Age: 84
End: 2024-07-22
Payer: MEDICARE

## 2024-07-22 DIAGNOSIS — I10 ESSENTIAL HYPERTENSION: ICD-10-CM

## 2024-07-22 RX ORDER — LISINOPRIL 20 MG/1
20 TABLET ORAL NIGHTLY
Qty: 90 TABLET | Refills: 1 | Status: SHIPPED | OUTPATIENT
Start: 2024-07-22 | End: 2024-07-25 | Stop reason: SDUPTHER

## 2024-07-22 RX ORDER — LISINOPRIL AND HYDROCHLOROTHIAZIDE 20; 25 MG/1; MG/1
1 TABLET ORAL
Qty: 90 TABLET | Refills: 1 | Status: SHIPPED | OUTPATIENT
Start: 2024-07-22 | End: 2024-07-25 | Stop reason: SDUPTHER

## 2024-07-22 NOTE — TELEPHONE ENCOUNTER
I received a phone call from [facility] and they need to know if the lisinopril is with or without Hydrochlorothiazide?     Thank you

## 2024-07-23 ENCOUNTER — LAB (OUTPATIENT)
Dept: LAB | Facility: LAB | Age: 84
End: 2024-07-23
Payer: MEDICARE

## 2024-07-23 DIAGNOSIS — E78.5 HYPERLIPIDEMIA, UNSPECIFIED HYPERLIPIDEMIA TYPE: ICD-10-CM

## 2024-07-23 DIAGNOSIS — I10 ESSENTIAL HYPERTENSION: ICD-10-CM

## 2024-07-23 DIAGNOSIS — E11.9 TYPE 2 DIABETES MELLITUS WITHOUT COMPLICATION, WITHOUT LONG-TERM CURRENT USE OF INSULIN (MULTI): ICD-10-CM

## 2024-07-23 DIAGNOSIS — E03.9 HYPOTHYROIDISM (ACQUIRED): ICD-10-CM

## 2024-07-23 LAB
ALBUMIN SERPL BCP-MCNC: 4.3 G/DL (ref 3.4–5)
ALP SERPL-CCNC: 55 U/L (ref 33–136)
ALT SERPL W P-5'-P-CCNC: 16 U/L (ref 7–45)
ANION GAP SERPL CALC-SCNC: 13 MMOL/L (ref 10–20)
AST SERPL W P-5'-P-CCNC: 18 U/L (ref 9–39)
BASOPHILS # BLD AUTO: 0.02 X10*3/UL (ref 0–0.1)
BASOPHILS NFR BLD AUTO: 0.3 %
BILIRUB SERPL-MCNC: 0.5 MG/DL (ref 0–1.2)
BUN SERPL-MCNC: 25 MG/DL (ref 6–23)
CALCIUM SERPL-MCNC: 9.8 MG/DL (ref 8.6–10.3)
CHLORIDE SERPL-SCNC: 98 MMOL/L (ref 98–107)
CHOLEST SERPL-MCNC: 145 MG/DL (ref 0–199)
CHOLESTEROL/HDL RATIO: 2.5
CO2 SERPL-SCNC: 26 MMOL/L (ref 21–32)
CREAT SERPL-MCNC: 0.73 MG/DL (ref 0.5–1.05)
CREAT UR-MCNC: 35.7 MG/DL (ref 20–320)
EGFRCR SERPLBLD CKD-EPI 2021: 83 ML/MIN/1.73M*2
EOSINOPHIL # BLD AUTO: 0.12 X10*3/UL (ref 0–0.4)
EOSINOPHIL NFR BLD AUTO: 1.8 %
ERYTHROCYTE [DISTWIDTH] IN BLOOD BY AUTOMATED COUNT: 12 % (ref 11.5–14.5)
EST. AVERAGE GLUCOSE BLD GHB EST-MCNC: 157 MG/DL
GLUCOSE SERPL-MCNC: 146 MG/DL (ref 74–99)
HBA1C MFR BLD: 7.1 %
HCT VFR BLD AUTO: 38 % (ref 36–46)
HDLC SERPL-MCNC: 58 MG/DL
HGB BLD-MCNC: 13 G/DL (ref 12–16)
IMM GRANULOCYTES # BLD AUTO: 0.01 X10*3/UL (ref 0–0.5)
IMM GRANULOCYTES NFR BLD AUTO: 0.1 % (ref 0–0.9)
LDLC SERPL CALC-MCNC: 64 MG/DL
LYMPHOCYTES # BLD AUTO: 2.35 X10*3/UL (ref 0.8–3)
LYMPHOCYTES NFR BLD AUTO: 35 %
MCH RBC QN AUTO: 34.3 PG (ref 26–34)
MCHC RBC AUTO-ENTMCNC: 34.2 G/DL (ref 32–36)
MCV RBC AUTO: 100 FL (ref 80–100)
MICROALBUMIN UR-MCNC: <7 MG/L
MICROALBUMIN/CREAT UR: NORMAL MG/G{CREAT}
MONOCYTES # BLD AUTO: 0.95 X10*3/UL (ref 0.05–0.8)
MONOCYTES NFR BLD AUTO: 14.2 %
NEUTROPHILS # BLD AUTO: 3.26 X10*3/UL (ref 1.6–5.5)
NEUTROPHILS NFR BLD AUTO: 48.6 %
NON HDL CHOLESTEROL: 87 MG/DL (ref 0–149)
NRBC BLD-RTO: 0 /100 WBCS (ref 0–0)
PLATELET # BLD AUTO: 220 X10*3/UL (ref 150–450)
POTASSIUM SERPL-SCNC: 4.3 MMOL/L (ref 3.5–5.3)
PROT SERPL-MCNC: 6.8 G/DL (ref 6.4–8.2)
RBC # BLD AUTO: 3.79 X10*6/UL (ref 4–5.2)
SODIUM SERPL-SCNC: 133 MMOL/L (ref 136–145)
TRIGL SERPL-MCNC: 113 MG/DL (ref 0–149)
TSH SERPL-ACNC: 2.07 MIU/L (ref 0.44–3.98)
VIT B12 SERPL-MCNC: 429 PG/ML (ref 211–911)
VLDL: 23 MG/DL (ref 0–40)
WBC # BLD AUTO: 6.7 X10*3/UL (ref 4.4–11.3)

## 2024-07-23 PROCEDURE — 83036 HEMOGLOBIN GLYCOSYLATED A1C: CPT

## 2024-07-23 PROCEDURE — 36415 COLL VENOUS BLD VENIPUNCTURE: CPT

## 2024-07-23 PROCEDURE — 80053 COMPREHEN METABOLIC PANEL: CPT

## 2024-07-23 PROCEDURE — 82570 ASSAY OF URINE CREATININE: CPT

## 2024-07-23 PROCEDURE — 80061 LIPID PANEL: CPT

## 2024-07-23 PROCEDURE — 82043 UR ALBUMIN QUANTITATIVE: CPT

## 2024-07-23 PROCEDURE — 85025 COMPLETE CBC W/AUTO DIFF WBC: CPT

## 2024-07-23 PROCEDURE — 84443 ASSAY THYROID STIM HORMONE: CPT

## 2024-07-23 PROCEDURE — 82607 VITAMIN B-12: CPT

## 2024-07-25 DIAGNOSIS — I10 ESSENTIAL HYPERTENSION: ICD-10-CM

## 2024-07-25 RX ORDER — LISINOPRIL 20 MG/1
20 TABLET ORAL NIGHTLY
Qty: 90 TABLET | Refills: 1 | Status: SHIPPED | OUTPATIENT
Start: 2024-07-25

## 2024-07-25 RX ORDER — LISINOPRIL AND HYDROCHLOROTHIAZIDE 20; 25 MG/1; MG/1
1 TABLET ORAL
Qty: 90 TABLET | Refills: 1 | Status: SHIPPED | OUTPATIENT
Start: 2024-07-25

## 2024-07-29 ENCOUNTER — APPOINTMENT (OUTPATIENT)
Dept: PRIMARY CARE | Facility: CLINIC | Age: 84
End: 2024-07-29
Payer: MEDICARE

## 2024-08-01 ENCOUNTER — PREP FOR PROCEDURE (OUTPATIENT)
Dept: OPERATING ROOM | Facility: HOSPITAL | Age: 84
End: 2024-08-01
Payer: MEDICARE

## 2024-08-01 DIAGNOSIS — H40.1112 PRIMARY OPEN ANGLE GLAUCOMA OF RIGHT EYE, MODERATE STAGE: Primary | ICD-10-CM

## 2024-08-01 RX ORDER — KETOROLAC TROMETHAMINE 5 MG/ML
1 SOLUTION OPHTHALMIC
OUTPATIENT
Start: 2024-08-01 | End: 2024-08-01

## 2024-08-01 RX ORDER — FLUOROMETHOLONE 1 MG/ML
1 SUSPENSION/ DROPS OPHTHALMIC ONCE
OUTPATIENT
Start: 2024-08-01 | End: 2024-08-01

## 2024-08-01 RX ORDER — TETRACAINE HYDROCHLORIDE 5 MG/ML
1 SOLUTION OPHTHALMIC ONCE
OUTPATIENT
Start: 2024-08-01 | End: 2024-08-01

## 2024-08-01 RX ORDER — POVIDONE-IODINE 5 %
SOLUTION, NON-ORAL OPHTHALMIC (EYE) ONCE
OUTPATIENT
Start: 2024-08-01 | End: 2024-08-01

## 2024-08-05 NOTE — PREPROCEDURE INSTRUCTIONS
No outpatient medications have been marked as taking for the 8/8/24 encounter (Hospital Encounter).                       NPO Instructions:  Nothing to eat or drink after midnight      Additional Instructions:     Will need  home, will receive call day before surgery with arrival time

## 2024-08-07 ENCOUNTER — ANESTHESIA EVENT (OUTPATIENT)
Dept: OPERATING ROOM | Facility: HOSPITAL | Age: 84
End: 2024-08-07
Payer: MEDICARE

## 2024-08-08 ENCOUNTER — ANESTHESIA (OUTPATIENT)
Dept: OPERATING ROOM | Facility: HOSPITAL | Age: 84
End: 2024-08-08
Payer: MEDICARE

## 2024-08-08 ENCOUNTER — HOSPITAL ENCOUNTER (OUTPATIENT)
Facility: HOSPITAL | Age: 84
Setting detail: OUTPATIENT SURGERY
Discharge: HOME | End: 2024-08-08
Attending: OPHTHALMOLOGY | Admitting: OPHTHALMOLOGY
Payer: MEDICARE

## 2024-08-08 VITALS
DIASTOLIC BLOOD PRESSURE: 52 MMHG | BODY MASS INDEX: 23.17 KG/M2 | HEART RATE: 66 BPM | WEIGHT: 144.18 LBS | HEIGHT: 66 IN | OXYGEN SATURATION: 98 % | SYSTOLIC BLOOD PRESSURE: 120 MMHG | TEMPERATURE: 97.1 F | RESPIRATION RATE: 16 BRPM

## 2024-08-08 PROBLEM — E05.90 HYPERTHYROIDISM: Status: ACTIVE | Noted: 2024-08-08

## 2024-08-08 LAB — GLUCOSE BLD MANUAL STRIP-MCNC: 136 MG/DL (ref 74–99)

## 2024-08-08 PROCEDURE — 3600000008 HC OR TIME - EACH INCREMENTAL 1 MINUTE - PROCEDURE LEVEL THREE: Performed by: OPHTHALMOLOGY

## 2024-08-08 PROCEDURE — 2500000005 HC RX 250 GENERAL PHARMACY W/O HCPCS: Performed by: OPHTHALMOLOGY

## 2024-08-08 PROCEDURE — 2500000005 HC RX 250 GENERAL PHARMACY W/O HCPCS: Performed by: ANESTHESIOLOGY

## 2024-08-08 PROCEDURE — 3700000001 HC GENERAL ANESTHESIA TIME - INITIAL BASE CHARGE: Performed by: OPHTHALMOLOGY

## 2024-08-08 PROCEDURE — 2500000004 HC RX 250 GENERAL PHARMACY W/ HCPCS (ALT 636 FOR OP/ED): Performed by: ANESTHESIOLOGY

## 2024-08-08 PROCEDURE — 82947 ASSAY GLUCOSE BLOOD QUANT: CPT

## 2024-08-08 PROCEDURE — 7100000009 HC PHASE TWO TIME - INITIAL BASE CHARGE: Performed by: OPHTHALMOLOGY

## 2024-08-08 PROCEDURE — 3600000003 HC OR TIME - INITIAL BASE CHARGE - PROCEDURE LEVEL THREE: Performed by: OPHTHALMOLOGY

## 2024-08-08 PROCEDURE — 3700000002 HC GENERAL ANESTHESIA TIME - EACH INCREMENTAL 1 MINUTE: Performed by: OPHTHALMOLOGY

## 2024-08-08 PROCEDURE — 2721000 HC OR 272 NO HCPCS: Performed by: OPHTHALMOLOGY

## 2024-08-08 PROCEDURE — 7100000010 HC PHASE TWO TIME - EACH INCREMENTAL 1 MINUTE: Performed by: OPHTHALMOLOGY

## 2024-08-08 RX ORDER — PROPOFOL 10 MG/ML
INJECTION, EMULSION INTRAVENOUS AS NEEDED
Status: DISCONTINUED | OUTPATIENT
Start: 2024-08-08 | End: 2024-08-08

## 2024-08-08 RX ORDER — SODIUM CHLORIDE, SODIUM LACTATE, POTASSIUM CHLORIDE, CALCIUM CHLORIDE 600; 310; 30; 20 MG/100ML; MG/100ML; MG/100ML; MG/100ML
100 INJECTION, SOLUTION INTRAVENOUS CONTINUOUS
Status: DISCONTINUED | OUTPATIENT
Start: 2024-08-08 | End: 2024-08-08 | Stop reason: HOSPADM

## 2024-08-08 RX ORDER — FLUOROMETHOLONE 1 MG/ML
1 SUSPENSION/ DROPS OPHTHALMIC ONCE
Status: DISCONTINUED | OUTPATIENT
Start: 2024-08-08 | End: 2024-08-08 | Stop reason: HOSPADM

## 2024-08-08 RX ORDER — TETRACAINE HYDROCHLORIDE 5 MG/ML
1 SOLUTION OPHTHALMIC ONCE
Status: DISCONTINUED | OUTPATIENT
Start: 2024-08-08 | End: 2024-08-08 | Stop reason: HOSPADM

## 2024-08-08 RX ORDER — KETOROLAC TROMETHAMINE 5 MG/ML
1 SOLUTION OPHTHALMIC
Status: DISPENSED | OUTPATIENT
Start: 2024-08-08 | End: 2024-08-08

## 2024-08-08 RX ORDER — OXYCODONE HYDROCHLORIDE 5 MG/1
5 TABLET ORAL ONCE
Status: DISCONTINUED | OUTPATIENT
Start: 2024-08-08 | End: 2024-08-08 | Stop reason: HOSPADM

## 2024-08-08 RX ORDER — POVIDONE-IODINE 5 %
SOLUTION, NON-ORAL OPHTHALMIC (EYE) ONCE
Status: DISCONTINUED | OUTPATIENT
Start: 2024-08-08 | End: 2024-08-08 | Stop reason: HOSPADM

## 2024-08-08 RX ORDER — MIDAZOLAM HYDROCHLORIDE 1 MG/ML
INJECTION INTRAMUSCULAR; INTRAVENOUS AS NEEDED
Status: DISCONTINUED | OUTPATIENT
Start: 2024-08-08 | End: 2024-08-08

## 2024-08-08 RX ORDER — LIDOCAINE HYDROCHLORIDE 10 MG/ML
INJECTION, SOLUTION EPIDURAL; INFILTRATION; INTRACAUDAL; PERINEURAL AS NEEDED
Status: DISCONTINUED | OUTPATIENT
Start: 2024-08-08 | End: 2024-08-08

## 2024-08-08 RX ORDER — FENTANYL CITRATE 50 UG/ML
INJECTION, SOLUTION INTRAMUSCULAR; INTRAVENOUS AS NEEDED
Status: DISCONTINUED | OUTPATIENT
Start: 2024-08-08 | End: 2024-08-08

## 2024-08-08 RX ORDER — LIDOCAINE HYDROCHLORIDE AND EPINEPHRINE 10; 10 MG/ML; UG/ML
INJECTION, SOLUTION INFILTRATION; PERINEURAL AS NEEDED
Status: DISCONTINUED | OUTPATIENT
Start: 2024-08-08 | End: 2024-08-08 | Stop reason: HOSPADM

## 2024-08-08 RX ORDER — ONDANSETRON HYDROCHLORIDE 2 MG/ML
4 INJECTION, SOLUTION INTRAVENOUS
Status: DISCONTINUED | OUTPATIENT
Start: 2024-08-08 | End: 2024-08-08 | Stop reason: HOSPADM

## 2024-08-08 RX ORDER — SODIUM CHLORIDE, SODIUM LACTATE, POTASSIUM CHLORIDE, CALCIUM CHLORIDE 600; 310; 30; 20 MG/100ML; MG/100ML; MG/100ML; MG/100ML
125 INJECTION, SOLUTION INTRAVENOUS CONTINUOUS
Status: DISCONTINUED | OUTPATIENT
Start: 2024-08-08 | End: 2024-08-08 | Stop reason: HOSPADM

## 2024-08-08 SDOH — HEALTH STABILITY: MENTAL HEALTH: CURRENT SMOKER: 0

## 2024-08-08 ASSESSMENT — COLUMBIA-SUICIDE SEVERITY RATING SCALE - C-SSRS
1. IN THE PAST MONTH, HAVE YOU WISHED YOU WERE DEAD OR WISHED YOU COULD GO TO SLEEP AND NOT WAKE UP?: NO
2. HAVE YOU ACTUALLY HAD ANY THOUGHTS OF KILLING YOURSELF?: NO
6. HAVE YOU EVER DONE ANYTHING, STARTED TO DO ANYTHING, OR PREPARED TO DO ANYTHING TO END YOUR LIFE?: NO

## 2024-08-08 ASSESSMENT — TONOMETRY
IOP_AUTOMATED: 1
OD_IOP_MMHG: 18
OS_IOP_MMHG: 18

## 2024-08-08 ASSESSMENT — PAIN SCALES - GENERAL
PAINLEVEL_OUTOF10: 0 - NO PAIN
PAINLEVEL_OUTOF10: 0 - NO PAIN

## 2024-08-08 ASSESSMENT — PAIN - FUNCTIONAL ASSESSMENT: PAIN_FUNCTIONAL_ASSESSMENT: 0-10

## 2024-08-08 NOTE — ANESTHESIA PROCEDURE NOTES
Airway  Date/Time: 8/8/2024 7:44 AM    Staffing  Performed: attending   Authorized by: Sami Deshpande MD PhD    Performed by: Sami Deshpande MD PhD  Patient location during procedure: OR    Final Airway Details  Final airway type: mask

## 2024-08-08 NOTE — H&P
History Of Present Illness  Debo Rodriguez is a 83 y.o. female presenting with Primary open angle glaucoma right eye, moderate stage.     Past Medical History  Past Medical History:   Diagnosis Date    Allergic     Arthritis     Asymptomatic menopausal state     History of menopause    Chronic kidney disease     Diabetes mellitus (Multi)     Disease of thyroid gland     Encounter for full-term uncomplicated delivery ([facility])     Normal vaginal delivery    Encounter for gynecological examination (general) (routine) without abnormal findings     Pap test, as part of routine gynecological examination    Glaucoma     Guillain Barré syndrome (Multi)     Heart murmur     Hypertension     Inflammatory bowel disease     Other conditions influencing health status     Menstruation    Personal history of other medical treatment     H/O bone density study    Personal history of other medical treatment     History of mammogram    Scoliosis     Sleep apnea        Surgical History  Past Surgical History:   Procedure Laterality Date    EYE SURGERY      OTHER SURGICAL HISTORY  12/03/2021    Cataract surgery        Social History  She reports that she has never smoked. She has never used smokeless tobacco. She reports that she does not drink alcohol and does not use drugs.    Family History  Family History   Problem Relation Name Age of Onset    Accidental death Father Ernesto Curtis         Allergies  Erythromycin, Zithromax [azithromycin], Flu vaccine 2011 (36 mos+)(pf), Sulfa (sulfonamide antibiotics), and Penicillins    Review of Systems     Physical Exam  Constitutional:       Appearance: Normal appearance. She is normal weight.   HENT:      Head: Normocephalic.      Jaw: There is normal jaw occlusion.      Right Ear: Tympanic membrane, ear canal and external ear normal.      Left Ear: Tympanic membrane, ear canal and external ear normal.      Nose: Nose normal.      Mouth/Throat:      Pharynx: Oropharynx is clear.   Eyes:      " General: Lids are normal.      Intraocular pressure: Right eye pressure is 18 mmHg. Left eye pressure is 18 mmHg. Measurements were taken using an automated tonometer.     Extraocular Movements: Extraocular movements intact.      Conjunctiva/sclera: Conjunctivae normal.      Pupils: Pupils are equal, round, and reactive to light.   Neck:      Trachea: Trachea normal.   Cardiovascular:      Rate and Rhythm: Normal rate.      Pulses: Normal pulses.      Heart sounds: Normal heart sounds.   Pulmonary:      Effort: Pulmonary effort is normal.      Breath sounds: Normal breath sounds.   Abdominal:      General: Abdomen is flat. Bowel sounds are normal.   Musculoskeletal:         General: Normal range of motion.      Cervical back: Normal range of motion and neck supple.   Skin:     General: Skin is warm and dry.   Neurological:      General: No focal deficit present.      Mental Status: She is alert and oriented to person, place, and time.   Psychiatric:         Mood and Affect: Mood normal.         Behavior: Behavior normal.         Thought Content: Thought content normal.         Judgment: Judgment normal.          Last Recorded Vitals  Blood pressure 164/55, pulse 67, resp. rate 17, height 1.676 m (5' 6\"), weight 65.4 kg (144 lb 2.9 oz), SpO2 97%.      Scheduled medications  fluorometholone, 1 drop, Right Eye, Once  moxifloxacin, 1.5 mg, Right Eye, Once  povidone-iodine, , Right Eye, Once  tetracaine, 1 drop, Right Eye, Once      Continuous medications  lactated Ringer's, 100 mL/hr, Last Rate: 100 mL/hr (08/08/24 0633)      PRN medications    Assessment/Plan   Principal Problem:    Primary open angle glaucoma of right eye, moderate stage    Plan Canaloplasty right eye    I spent 15 minutes in the professional and overall care of this patient.      Meet Moreira MD    "

## 2024-08-08 NOTE — OP NOTE
Canaloplasty Eye (R) Operative Note     Date: 2024  OR Location: [facility] OR    Name: Debo Rodriguez, : 1940, Age: 83 y.o., MRN: [id number], Sex: female    Diagnosis  Pre-op Diagnosis      * Primary open angle glaucoma of right eye, moderate stage [H40.1112] Post-op Diagnosis     * Primary open angle glaucoma of right eye, moderate stage [H40.1112]     Procedures  Canaloplasty Eye  04565 - DE TRLUML DILAT AQUEOUS O/F CAN WO RETENTION DEV/ST      Surgeons      * eMet Moreira - Primary    Resident/Fellow/Other Assistant:  Surgeons and Role:  * No surgeons found with a matching role *    Procedure Summary  Anesthesia: Monitor Anesthesia Care  ASA: III  Anesthesia Staff: Anesthesiologist: Sami Deshpande MD PhD  Estimated Blood Loss: None  Intra-op Medications:   Administrations occurring from 0730 to 0800 on 24:   Medication Name Total Dose   lidocaine-epinephrine (Xylocaine W/EPI) 1 %-1:100,000 injection 6 mL   lactated Ringer's infusion Cannot be calculated     Anesthesia Record        Intraprocedure I/O Totals          Intake    lactated Ringer's infusion 600.00 mL    Total Intake 600 mL     Specimen: No specimens collected     Staff:   Circulator: Barbara Howard Person: Edwin    Drains and/or Catheters: * None in log *    Implants:     Findings: Primary Open Angle Glaucoma Right Eye, Moderate Stage      Indications: Debo Rodriguez is an 83 y.o. female who is having surgery for Primary open angle glaucoma of right eye, moderate stage [H40.1112]. Intraocular pressure not at desired target.  Patient reports side effects of eye medications are negatively impacting her quality of life.  Inability to comply with glaucoma eye drop requirements.  Failed medical management.     The patient was seen in the preoperative area. The risks, benefits, complications, treatment options, non-operative alternatives, expected recovery and outcomes were discussed with the patient. The possibilities of reaction to  medication, pulmonary aspiration, injury to surrounding structures, bleeding, recurrent infection, the need for additional procedures, failure to diagnose a condition, and creating a complication requiring transfusion or operation were discussed with the patient. The patient concurred with the proposed plan, giving informed consent.  The site of surgery was properly noted/marked if necessary per policy. The patient has been actively warmed in preoperative area. Preoperative antibiotics are not indicated. Venous thrombosis prophylaxis are not indicated.    Procedure Details: The patient was correctly identified in the preop area and the operative eye was marked with a marking pen. The patient was then taken to the operating room where timeout was performed before starting the procedure. Combined anesthesia  with intravenous sedation and topical tetracaine eyedrops were given the right eye. A peribulbar block was given using 1% Lidocaine with Epinephrine. The operative eye was prepped and draped in the standard sterile ophthalmic fashion in  preparation for ophthalmic surgery.  A Tia wire speculum was then inserted between the eyelids of the right eye and the operating microscope was placed over the right eye.  A near limbal clear corneal incision was fashioned in the temporal quadrant just outside the vascular arcade and Viscoat was injected into anterior chamber to firm the eye.      The patients head was rotated to the left side and the microscope was rotated to the right.  Using the gonioprism the nasal trabecular meshwork was brought into clear view.       Viscoat was used to inflate the nasal angle as well as the anterior chamber.  A small amount of viscoat was placed on the cornea.  The iTrack Advance cannula was inserted into the anterior chamber and was used to perform a nasal goniotomy.  Through this incision, the iTrack catheter was advanced into the Schlemm's Canal by advancing the actuator and the  catheter navigated 360 degrees through Schlemm's canal.  Upon withdrawal of the iTrack catheter, provisc was injected into Schlemm;s canal allowing for vasodilation and focal disruptions of the trabecular platelets to allow better outflow of aqueous.  Approximately 9 clicks of viscoelastic per 3 clock hours in 360 degrees was placed inside Schlemm's canal.  The iTrack catheter was then removed from the eye and the patients head was rotated back to the neutral position.  An I & A (irrigation & aspiration) handpiece was then used to remove the Viscoat from the anterior chamber as well as any blood products that may have collected during the viscoanalostomy.      Vigamox was then injected into the anterior chamber and into the capsule bag through the paracentesis incision. The surgical wound was then inspected and found to be watertight.  The wire speculum and drapes were then removed.  FML eyedrops, Acular eyedrops, Pilocar 1% eye drops and Betadine 5% sterile ophthalmic solution were instilled in the conjunctival sac. Maxitrol ointment was instilled and a patch and shield were applied.      The patient tolerated the procedure well and was taken to recovery room in stable condition.      Complications:  None; patient tolerated the procedure well.    Disposition:  Home  Condition: stable       Attending Attestation: I performed the procedure.    Meet Moreira  Phone Number: [phone]

## 2024-08-08 NOTE — ANESTHESIA POSTPROCEDURE EVALUATION
Patient: Debo Rodriguez    Procedure Summary       Date: 08/08/24 Room / Location: [facility] OR  / [facility]    Anesthesia Start: 0733 Anesthesia Stop: 0808    Procedure: Canaloplasty Eye (Right: Eye) Diagnosis:       Primary open angle glaucoma of right eye, moderate stage      (Primary open angle glaucoma of right eye, moderate stage [H40.1112])    Surgeons: Meet Moreira MD Responsible Provider: Sami Deshpande MD PhD    Anesthesia Type: MAC ASA Status: 3            Anesthesia Type: MAC    Vitals Value Taken Time   /52 08/08/24 0807   Temp 36.2 °C (97.1 °F) 08/08/24 0807   Pulse 66 08/08/24 0807   Resp 16 08/08/24 0807   SpO2 98 % 08/08/24 0807       Anesthesia Post Evaluation    Patient location during evaluation: PACU  Patient participation: complete - patient participated  Level of consciousness: awake and alert  Pain management: satisfactory to patient  Airway patency: patent  Cardiovascular status: acceptable and stable  Respiratory status: unassisted and spontaneous ventilation  Hydration status: euvolemic  Postoperative Nausea and Vomiting: none        No notable events documented.

## 2024-08-08 NOTE — ANESTHESIA PREPROCEDURE EVALUATION
Patient: Debo Rodriguez    Procedure Information       Date/Time: 08/08/24 0730    Procedure: Canaloplasty Eye (Right: Eye)    Location: [facility] OR  / [facility]    Surgeons: Meet Moreira MD            Relevant Problems   Anesthesia (within normal limits)      Cardiac   (+) Essential hypertension   (+) Hyperlipidemia   (+) Murmur      Pulmonary   (+) Obstructive sleep apnea syndrome   (-) Asthma (HHS-HCC)   (-) Chronic obstructive pulmonary disease (Multi)      GI   (+) Gastroesophageal reflux disease      /Renal   (+) Chronic renal insufficiency   (-) ESRD (end stage renal disease) (Multi)      Endocrine   (+) Hyperthyroidism   (+) Hypothyroidism (acquired)   (+) Type 2 diabetes mellitus (Multi)   (-) Obesity      Hematology   (-) Coagulopathy (Multi)      HEENT   (+) Primary open angle glaucoma of left eye, moderate stage   (+) Primary open angle glaucoma of right eye, moderate stage       Clinical information reviewed:   Tobacco  Allergies  Meds   Med Hx  Surg Hx   Fam Hx  Soc Hx        NPO Detail:  NPO/Void Status  Carbohydrate Drink Given Prior to Surgery? : N  Date of Last Liquid: 08/08/24  Time of Last Liquid: 0500 (sip of water)  Date of Last Solid: 08/07/24  Time of Last Solid: 2100  Last Intake Type: Clear fluids  Time of Last Void: 0600         Physical Exam    Airway  Mallampati: III  TM distance: <3 FB     Cardiovascular   Rhythm: regular  Rate: normal     Dental - normal exam     Pulmonary - normal exam     Abdominal            Anesthesia Plan    History of general anesthesia?: yes  History of complications of general anesthesia?: no    ASA 3     MAC     The patient is not a current smoker.  Patient did not smoke on day of procedure.    intravenous induction   Anesthetic plan and risks discussed with spouse and patient.

## 2024-08-12 ENCOUNTER — APPOINTMENT (OUTPATIENT)
Dept: PRIMARY CARE | Facility: CLINIC | Age: 84
End: 2024-08-12
Payer: MEDICARE

## 2024-08-12 VITALS
HEIGHT: 66 IN | HEART RATE: 72 BPM | SYSTOLIC BLOOD PRESSURE: 132 MMHG | OXYGEN SATURATION: 94 % | BODY MASS INDEX: 23.35 KG/M2 | WEIGHT: 145.3 LBS | DIASTOLIC BLOOD PRESSURE: 84 MMHG

## 2024-08-12 DIAGNOSIS — Z12.31 VISIT FOR SCREENING MAMMOGRAM: Primary | ICD-10-CM

## 2024-08-12 DIAGNOSIS — E78.5 HYPERLIPIDEMIA, UNSPECIFIED HYPERLIPIDEMIA TYPE: ICD-10-CM

## 2024-08-12 DIAGNOSIS — E03.9 HYPOTHYROIDISM (ACQUIRED): ICD-10-CM

## 2024-08-12 DIAGNOSIS — Z00.00 ROUTINE GENERAL MEDICAL EXAMINATION AT HEALTH CARE FACILITY: ICD-10-CM

## 2024-08-12 DIAGNOSIS — I10 ESSENTIAL HYPERTENSION: ICD-10-CM

## 2024-08-12 DIAGNOSIS — E11.9 TYPE 2 DIABETES MELLITUS WITHOUT COMPLICATION, WITHOUT LONG-TERM CURRENT USE OF INSULIN (MULTI): ICD-10-CM

## 2024-08-12 RX ORDER — FLUOROMETHOLONE 1 MG/ML
1 SUSPENSION/ DROPS OPHTHALMIC 3 TIMES DAILY
COMMUNITY
Start: 2024-08-09

## 2024-08-12 ASSESSMENT — ACTIVITIES OF DAILY LIVING (ADL)
DRESSING: INDEPENDENT
MANAGING_FINANCES: INDEPENDENT
TAKING_MEDICATION: INDEPENDENT
GROCERY_SHOPPING: INDEPENDENT
BATHING: INDEPENDENT
DOING_HOUSEWORK: INDEPENDENT

## 2024-08-12 ASSESSMENT — PATIENT HEALTH QUESTIONNAIRE - PHQ9
2. FEELING DOWN, DEPRESSED OR HOPELESS: NOT AT ALL
1. LITTLE INTEREST OR PLEASURE IN DOING THINGS: NOT AT ALL
SUM OF ALL RESPONSES TO PHQ9 QUESTIONS 1 AND 2: 0

## 2024-08-12 NOTE — PATIENT INSTRUCTIONS
Continue current medications.  She may use OTC ear wax softener/remover kit that she has at home.  Follow up with specialists as scheduled.  Follow up in 6 months, sooner if needed.

## 2024-08-12 NOTE — PROGRESS NOTES
Subjective   Patient ID: Debo Rodriguez is a 83 y.o. female who presents for 6 month follow up. Labs completed.     HPI     Review of Systems    Objective   There were no vitals taken for this visit.    Physical Exam    Assessment/Plan

## 2024-08-12 NOTE — PROGRESS NOTES
"Subjective   Reason for Visit: Debo Rodriguez is an 83 y.o. female here for a Medicare Wellness visit.     Past Medical, Surgical, and Family History reviewed and updated in chart.    Reviewed all medications by prescribing practitioner or clinical pharmacist (such as prescriptions, OTCs, herbal therapies and supplements) and documented in the medical record.    Here for follow up HTN, DM, high chol, GERD, osteoporosis, hypothyroidism, JOVANNA on CPAP - tolerating well.  She has a h/o GBS in 2009.  Developed diabetes at that time.  She has been diet controlled and is trying to avoid medication if she can.  She is feeling good.  No specific concerns at this time.  She has been having some eye issues - seeing Dr Moreira.  She will be seeing her dermatologist soon.      She feels like her left ear is plugged.        Advance directives:. Advanced Care Planning discussed and documented advance care plan or surrogate decision maker documented in the medical record. Patient has living will. Patient has healthcare POA.             Patient Care Team:  Rizwana Daley MD as PCP - General (Family Medicine)         Objective   Vitals:  /84 (BP Location: Left arm, Patient Position: Sitting, BP Cuff Size: Adult)   Pulse 72   Ht 1.676 m (5' 6\")   Wt 65.9 kg (145 lb 4.8 oz)   SpO2 94%   BMI 23.45 kg/m²       Physical Exam  Vitals reviewed.   Constitutional:       General: She is not in acute distress.  HENT:      Right Ear: Tympanic membrane normal.      Left Ear: Tympanic membrane normal.      Ears:      Comments: There is a little bit of wax in the left ear canal  Cardiovascular:      Rate and Rhythm: Normal rate and regular rhythm.      Heart sounds: No murmur heard.  Pulmonary:      Effort: Pulmonary effort is normal. No respiratory distress.      Breath sounds: Normal breath sounds.   Skin:     General: Skin is warm and dry.   Neurological:      General: No focal deficit present.      Mental Status: She is " alert. Mental status is at baseline.        Latest Reference Range & Units 07/23/24 07:46   GLUCOSE 74 - 99 mg/dL 146 (H)   SODIUM 136 - 145 mmol/L 133 (L)   POTASSIUM 3.5 - 5.3 mmol/L 4.3   CHLORIDE 98 - 107 mmol/L 98   Bicarbonate 21 - 32 mmol/L 26   Anion Gap 10 - 20 mmol/L 13   Blood Urea Nitrogen 6 - 23 mg/dL 25 (H)   Creatinine 0.50 - 1.05 mg/dL 0.73   EGFR >60 mL/min/1.73m*2 83   Calcium 8.6 - 10.3 mg/dL 9.8   Albumin 3.4 - 5.0 g/dL 4.3   Alkaline Phosphatase 33 - 136 U/L 55   ALT 7 - 45 U/L 16   AST 9 - 39 U/L 18   Bilirubin Total 0.0 - 1.2 mg/dL 0.5   HDL CHOLESTEROL mg/dL 58.0   Cholesterol/HDL Ratio  2.5   LDL Calculated <=99 mg/dL 64   VLDL 0 - 40 mg/dL 23   TRIGLYCERIDES 0 - 149 mg/dL 113   Non HDL Cholesterol 0 - 149 mg/dL 87   Total Protein 6.4 - 8.2 g/dL 6.8   CHOLESTEROL 0 - 199 mg/dL 145   Vitamin B12 211 - 911 pg/mL 429   Hemoglobin A1C see below % 7.1 (H)   Thyroid Stimulating Hormone 0.44 - 3.98 mIU/L 2.07   Estimated Average Glucose Not Established mg/dL 157   LEUKOCYTES (10*3/UL) IN BLOOD BY AUTOMATED COUNT, [place] 4.4 - 11.3 x10*3/uL 6.7   nRBC 0.0 - 0.0 /100 WBCs 0.0   ERYTHROCYTES (10*6/UL) IN BLOOD BY AUTOMATED COUNT, [place] 4.00 - 5.20 x10*6/uL 3.79 (L)   HEMOGLOBIN 12.0 - 16.0 g/dL 13.0   HEMATOCRIT 36.0 - 46.0 % 38.0   MCV 80 - 100 fL 100   MCH 26.0 - 34.0 pg 34.3 (H)   MCHC 32.0 - 36.0 g/dL 34.2   RED CELL DISTRIBUTION WIDTH 11.5 - 14.5 % 12.0   PLATELETS (10*3/UL) IN BLOOD AUTOMATED COUNT, [place] 150 - 450 x10*3/uL 220   NEUTROPHILS/100 LEUKOCYTES IN BLOOD BY AUTOMATED COUNT, [place] 40.0 - 80.0 % 48.6   Immature Granulocytes %, Automated 0.0 - 0.9 % 0.1   Lymphocytes % 13.0 - 44.0 % 35.0   Monocytes % 2.0 - 10.0 % 14.2   Eosinophils % 0.0 - 6.0 % 1.8   Basophils % 0.0 - 2.0 % 0.3   NEUTROPHILS (10*3/UL) IN BLOOD BY AUTOMATED COUNT, [place] 1.60 - 5.50 x10*3/uL 3.26   Immature Granulocytes Absolute, Automated 0.00 - 0.50 x10*3/uL 0.01   Lymphocytes Absolute 0.80 - 3.00  x10*3/uL 2.35   Monocytes Absolute 0.05 - 0.80 x10*3/uL 0.95 (H)   Eosinophils Absolute 0.00 - 0.40 x10*3/uL 0.12   Basophils Absolute 0.00 - 0.10 x10*3/uL 0.02   (H): Data is abnormally high  (L): Data is abnormally low    Assessment/Plan   Problem List Items Addressed This Visit             ICD-10-CM    Type 2 diabetes mellitus (Multi) E11.9    Relevant Orders    CBC and Auto Differential    Comprehensive Metabolic Panel    Hemoglobin A1C    Albumin-Creatinine Ratio, Urine Random    Lipid Panel    TSH with reflex to Free T4 if abnormal    Vitamin B12    Hypothyroidism (acquired) E03.9    Relevant Orders    CBC and Auto Differential    Comprehensive Metabolic Panel    Hemoglobin A1C    Albumin-Creatinine Ratio, Urine Random    Lipid Panel    TSH with reflex to Free T4 if abnormal    Vitamin B12    Hyperlipidemia E78.5    Relevant Orders    CBC and Auto Differential    Comprehensive Metabolic Panel    Hemoglobin A1C    Albumin-Creatinine Ratio, Urine Random    Lipid Panel    TSH with reflex to Free T4 if abnormal    Vitamin B12    Essential hypertension I10    Relevant Orders    CBC and Auto Differential    Comprehensive Metabolic Panel    Hemoglobin A1C    Albumin-Creatinine Ratio, Urine Random    Lipid Panel    TSH with reflex to Free T4 if abnormal    Vitamin B12     Other Visit Diagnoses         Codes    Visit for screening mammogram    -  Primary Z12.31    Relevant Orders    BI mammo bilateral screening tomosynthesis    Routine general medical examination at health care facility     Z00.00

## 2024-08-13 DIAGNOSIS — E11.9 TYPE 2 DIABETES MELLITUS WITHOUT COMPLICATION, WITHOUT LONG-TERM CURRENT USE OF INSULIN (MULTI): ICD-10-CM

## 2024-08-13 RX ORDER — METFORMIN HYDROCHLORIDE 500 MG/1
500 TABLET, EXTENDED RELEASE ORAL
Qty: 90 TABLET | Refills: 1 | Status: SHIPPED | OUTPATIENT
Start: 2024-08-13 | End: 2025-02-09

## 2024-08-14 DIAGNOSIS — I10 ESSENTIAL HYPERTENSION: ICD-10-CM

## 2024-08-15 RX ORDER — AMLODIPINE BESYLATE 5 MG/1
5 TABLET ORAL DAILY
Qty: 90 TABLET | Refills: 3 | Status: SHIPPED | OUTPATIENT
Start: 2024-08-15

## 2024-08-19 ENCOUNTER — APPOINTMENT (OUTPATIENT)
Dept: URBAN - METROPOLITAN AREA CLINIC 204 | Age: 84
Setting detail: DERMATOLOGY
End: 2024-08-20

## 2024-08-19 PROCEDURE — 17000 DESTRUCT PREMALG LESION: CPT

## 2024-08-19 PROCEDURE — 17003 DESTRUCT PREMALG LES 2-14: CPT

## 2024-08-19 PROCEDURE — 99213 OFFICE O/P EST LOW 20 MIN: CPT | Mod: 25

## 2024-08-19 PROCEDURE — OTHER MIPS QUALITY: OTHER

## 2024-08-19 NOTE — PROCEDURE: MIPS QUALITY
Quality 47: Advance Care Plan: Advance Care Planning discussed and documented; advance care plan or surrogate decision maker documented in the medical record.
Detail Level: Detailed
Quality 226: Preventive Care And Screening: Tobacco Use: Screening And Cessation Intervention: Patient screened for tobacco use and is an ex/non-smoker
Additional Notes: Documentation for MIPS  purposes only. Full Patient visit note from paper chart is available for review and also scanned in EMA chart.

## 2024-08-27 ENCOUNTER — TELEPHONE (OUTPATIENT)
Age: 84
End: 2024-08-27
Payer: MEDICARE

## 2024-08-27 NOTE — TELEPHONE ENCOUNTER
Patient left a voicemail stating 6 months ago they had a incident where they almost passed out. It happened again yesterday. Patient states she took her sugar and it was fine. She is worried it might be signs of a stroke. They are asking should they follow up with Cardiology or with you? She would like to figure it out.

## 2024-08-27 NOTE — TELEPHONE ENCOUNTER
She can start here, I would recommend an appointment so we can go over all her symptoms and order the right tests.  Thanks.

## 2024-08-29 ENCOUNTER — OFFICE VISIT (OUTPATIENT)
Age: 84
End: 2024-08-29
Payer: MEDICARE

## 2024-08-29 VITALS
HEIGHT: 66 IN | DIASTOLIC BLOOD PRESSURE: 66 MMHG | SYSTOLIC BLOOD PRESSURE: 145 MMHG | OXYGEN SATURATION: 96 % | WEIGHT: 146.5 LBS | HEART RATE: 75 BPM | BODY MASS INDEX: 23.54 KG/M2

## 2024-08-29 DIAGNOSIS — R26.89 DIFFICULTY BALANCING WHEN BENDING: Primary | ICD-10-CM

## 2024-08-29 DIAGNOSIS — R42 DIZZINESS: ICD-10-CM

## 2024-08-29 DIAGNOSIS — R42 VERTIGO: ICD-10-CM

## 2024-08-29 PROCEDURE — 1160F RVW MEDS BY RX/DR IN RCRD: CPT | Performed by: FAMILY MEDICINE

## 2024-08-29 PROCEDURE — 3078F DIAST BP <80 MM HG: CPT | Performed by: FAMILY MEDICINE

## 2024-08-29 PROCEDURE — 1159F MED LIST DOCD IN RCRD: CPT | Performed by: FAMILY MEDICINE

## 2024-08-29 PROCEDURE — 1036F TOBACCO NON-USER: CPT | Performed by: FAMILY MEDICINE

## 2024-08-29 PROCEDURE — 3077F SYST BP >= 140 MM HG: CPT | Performed by: FAMILY MEDICINE

## 2024-08-29 PROCEDURE — 99213 OFFICE O/P EST LOW 20 MIN: CPT | Performed by: FAMILY MEDICINE

## 2024-08-29 RX ORDER — UBIDECARENONE 75 MG
500 CAPSULE ORAL DAILY
COMMUNITY

## 2024-08-29 RX ORDER — CALCIUM CARB, CITRATE, MALATE 250 MG
CAPSULE ORAL
COMMUNITY

## 2024-08-29 RX ORDER — PREDNISONE 20 MG/1
20 TABLET ORAL DAILY
Qty: 5 TABLET | Refills: 0 | Status: SHIPPED | OUTPATIENT
Start: 2024-08-29 | End: 2024-09-03

## 2024-08-29 NOTE — PROGRESS NOTES
Subjective   Patient ID: Debo Rodriguez is a 83 y.o. female who presents for lightheadedness with any sudden movements. Has had 3 so far recently.     HPI     Review of Systems    Objective   There were no vitals taken for this visit.    Physical Exam    Assessment/Plan

## 2024-08-29 NOTE — PATIENT INSTRUCTIONS
Discussed getting some testing but it does sound like vertigo, we will try a short course of steroids.  Discussed getting an MRI and she would prefer to avoid that at this time.  She will let us know if it persists or does not improve.

## 2024-08-29 NOTE — PROGRESS NOTES
Subjective   Debo Rodriguez is a 83 y.o. female who presents for No chief complaint on file..  Here c/o lightheadedness/possibly vertigo.  She states that the day before yesterday she was sitting at the table and had a wave of dizziness, especially when she moved her head.  She had another episode today.  [place] like things were spinning.  Some nausea, no vomiting.  She states that it happened one time a few months ago - she has not paid much attention to it.  She has had vertigo in the past as well.  She does have some issues with her balance at times, is walking with a cane.              Objective   Visit Vitals  /66 (BP Location: Left arm, Patient Position: Sitting, BP Cuff Size: Adult)   Pulse 75      Physical Exam  Vitals reviewed.   Constitutional:       General: She is not in acute distress.  Cardiovascular:      Rate and Rhythm: Normal rate and regular rhythm.      Heart sounds: No murmur heard.  Pulmonary:      Effort: Pulmonary effort is normal. No respiratory distress.      Breath sounds: Normal breath sounds.   Skin:     General: Skin is warm and dry.   Neurological:      General: No focal deficit present.      Mental Status: She is alert. Mental status is at baseline.         Assessment/Plan   Problem List Items Addressed This Visit    None  Visit Diagnoses       Difficulty balancing when bending    -  Primary    Vertigo        Relevant Medications    predniSONE (Deltasone) 20 mg tablet    Dizziness                   Rizwana Daley MD

## 2024-09-04 DIAGNOSIS — I10 ESSENTIAL HYPERTENSION: ICD-10-CM

## 2024-09-04 RX ORDER — CARVEDILOL 6.25 MG/1
6.25 TABLET ORAL 2 TIMES DAILY
Qty: 180 TABLET | Refills: 1 | Status: SHIPPED | OUTPATIENT
Start: 2024-09-04

## 2024-09-12 ENCOUNTER — HOSPITAL ENCOUNTER (OUTPATIENT)
Dept: RADIOLOGY | Facility: HOSPITAL | Age: 84
Discharge: HOME | End: 2024-09-12
Payer: MEDICARE

## 2024-09-12 VITALS — BODY MASS INDEX: 23.54 KG/M2 | WEIGHT: 146.5 LBS | HEIGHT: 66 IN

## 2024-09-12 DIAGNOSIS — Z12.31 VISIT FOR SCREENING MAMMOGRAM: ICD-10-CM

## 2024-09-12 PROCEDURE — 77067 SCR MAMMO BI INCL CAD: CPT

## 2024-09-13 DIAGNOSIS — E78.5 HYPERLIPIDEMIA, UNSPECIFIED HYPERLIPIDEMIA TYPE: Primary | ICD-10-CM

## 2024-09-13 RX ORDER — LOVASTATIN 20 MG/1
20 TABLET ORAL NIGHTLY
Qty: 90 TABLET | Refills: 1 | Status: SHIPPED | OUTPATIENT
Start: 2024-09-13

## 2024-09-20 ENCOUNTER — APPOINTMENT (OUTPATIENT)
Dept: URBAN - METROPOLITAN AREA CLINIC 204 | Age: 84
Setting detail: DERMATOLOGY
End: 2024-09-23

## 2024-09-20 PROCEDURE — OTHER MIPS QUALITY: OTHER

## 2024-09-20 PROCEDURE — 17003 DESTRUCT PREMALG LES 2-14: CPT

## 2024-09-20 PROCEDURE — 99213 OFFICE O/P EST LOW 20 MIN: CPT | Mod: 25

## 2024-09-20 PROCEDURE — 17000 DESTRUCT PREMALG LESION: CPT

## 2024-10-01 ENCOUNTER — HOSPITAL ENCOUNTER (OUTPATIENT)
Dept: RADIOLOGY | Facility: EXTERNAL LOCATION | Age: 84
Discharge: HOME | End: 2024-10-01

## 2024-11-04 ENCOUNTER — APPOINTMENT (OUTPATIENT)
Dept: CARDIOLOGY | Facility: HOSPITAL | Age: 84
End: 2024-11-04
Payer: MEDICARE

## 2024-12-10 DIAGNOSIS — I10 ESSENTIAL HYPERTENSION: ICD-10-CM

## 2024-12-11 DIAGNOSIS — I10 ESSENTIAL HYPERTENSION: ICD-10-CM

## 2024-12-11 RX ORDER — LISINOPRIL 20 MG/1
20 TABLET ORAL NIGHTLY
Qty: 90 TABLET | Refills: 3 | Status: SHIPPED | OUTPATIENT
Start: 2024-12-11

## 2024-12-11 RX ORDER — LISINOPRIL AND HYDROCHLOROTHIAZIDE 20; 25 MG/1; MG/1
1 TABLET ORAL
Qty: 90 TABLET | Refills: 3 | Status: SHIPPED | OUTPATIENT
Start: 2024-12-11

## 2024-12-16 ENCOUNTER — APPOINTMENT (OUTPATIENT)
Dept: CARDIOLOGY | Facility: CLINIC | Age: 84
End: 2024-12-16
Payer: MEDICARE

## 2024-12-16 DIAGNOSIS — E11.69 TYPE 2 DIABETES MELLITUS WITH OTHER SPECIFIED COMPLICATION, UNSPECIFIED WHETHER LONG TERM INSULIN USE (MULTI): ICD-10-CM

## 2024-12-16 RX ORDER — BLOOD SUGAR DIAGNOSTIC
1 STRIP MISCELLANEOUS DAILY
Qty: 100 STRIP | Refills: 2 | Status: SHIPPED | OUTPATIENT
Start: 2024-12-16

## 2024-12-24 DIAGNOSIS — E11.9 TYPE 2 DIABETES MELLITUS WITHOUT COMPLICATION, WITHOUT LONG-TERM CURRENT USE OF INSULIN (MULTI): ICD-10-CM

## 2025-01-07 DIAGNOSIS — E11.9 TYPE 2 DIABETES MELLITUS WITHOUT COMPLICATION, WITHOUT LONG-TERM CURRENT USE OF INSULIN (MULTI): ICD-10-CM

## 2025-01-07 RX ORDER — METFORMIN HYDROCHLORIDE 500 MG/1
500 TABLET, EXTENDED RELEASE ORAL
Qty: 90 TABLET | Refills: 1 | Status: SHIPPED | OUTPATIENT
Start: 2025-01-07 | End: 2025-07-06

## 2025-01-07 RX ORDER — METFORMIN HYDROCHLORIDE 500 MG/1
TABLET, EXTENDED RELEASE ORAL
Qty: 90 TABLET | Refills: 3 | Status: SHIPPED | OUTPATIENT
Start: 2025-01-07

## 2025-01-17 DIAGNOSIS — I10 ESSENTIAL HYPERTENSION: ICD-10-CM

## 2025-01-20 RX ORDER — CARVEDILOL 6.25 MG/1
6.25 TABLET ORAL 2 TIMES DAILY
Qty: 180 TABLET | Refills: 3 | Status: SHIPPED | OUTPATIENT
Start: 2025-01-20

## 2025-01-24 ENCOUNTER — LAB (OUTPATIENT)
Facility: LAB | Age: 85
End: 2025-01-24
Payer: MEDICARE

## 2025-01-24 DIAGNOSIS — E03.9 HYPOTHYROIDISM (ACQUIRED): ICD-10-CM

## 2025-01-24 DIAGNOSIS — E78.5 HYPERLIPIDEMIA, UNSPECIFIED HYPERLIPIDEMIA TYPE: ICD-10-CM

## 2025-01-24 DIAGNOSIS — E11.9 TYPE 2 DIABETES MELLITUS WITHOUT COMPLICATION, WITHOUT LONG-TERM CURRENT USE OF INSULIN (MULTI): ICD-10-CM

## 2025-01-24 DIAGNOSIS — I10 ESSENTIAL HYPERTENSION: ICD-10-CM

## 2025-01-24 LAB
ALBUMIN SERPL BCP-MCNC: 4.3 G/DL (ref 3.4–5)
ALP SERPL-CCNC: 54 U/L (ref 33–136)
ALT SERPL W P-5'-P-CCNC: 15 U/L (ref 7–45)
ANION GAP SERPL CALC-SCNC: 11 MMOL/L (ref 10–20)
AST SERPL W P-5'-P-CCNC: 17 U/L (ref 9–39)
BASOPHILS # BLD AUTO: 0.01 X10*3/UL (ref 0–0.1)
BASOPHILS NFR BLD AUTO: 0.2 %
BILIRUB SERPL-MCNC: 0.5 MG/DL (ref 0–1.2)
BUN SERPL-MCNC: 22 MG/DL (ref 6–23)
CALCIUM SERPL-MCNC: 10.1 MG/DL (ref 8.6–10.3)
CHLORIDE SERPL-SCNC: 97 MMOL/L (ref 98–107)
CHOLEST SERPL-MCNC: 144 MG/DL (ref 0–199)
CHOLESTEROL/HDL RATIO: 2.3
CO2 SERPL-SCNC: 30 MMOL/L (ref 21–32)
CREAT SERPL-MCNC: 0.74 MG/DL (ref 0.5–1.05)
EGFRCR SERPLBLD CKD-EPI 2021: 81 ML/MIN/1.73M*2
EOSINOPHIL # BLD AUTO: 0.06 X10*3/UL (ref 0–0.4)
EOSINOPHIL NFR BLD AUTO: 0.9 %
ERYTHROCYTE [DISTWIDTH] IN BLOOD BY AUTOMATED COUNT: 11.8 % (ref 11.5–14.5)
EST. AVERAGE GLUCOSE BLD GHB EST-MCNC: 166 MG/DL
GLUCOSE SERPL-MCNC: 149 MG/DL (ref 74–99)
HBA1C MFR BLD: 7.4 %
HCT VFR BLD AUTO: 39.2 % (ref 36–46)
HDLC SERPL-MCNC: 62 MG/DL
HGB BLD-MCNC: 13.3 G/DL (ref 12–16)
IMM GRANULOCYTES # BLD AUTO: 0.01 X10*3/UL (ref 0–0.5)
IMM GRANULOCYTES NFR BLD AUTO: 0.2 % (ref 0–0.9)
LDLC SERPL CALC-MCNC: 62 MG/DL
LYMPHOCYTES # BLD AUTO: 2.53 X10*3/UL (ref 0.8–3)
LYMPHOCYTES NFR BLD AUTO: 39.2 %
MCH RBC QN AUTO: 33.8 PG (ref 26–34)
MCHC RBC AUTO-ENTMCNC: 33.9 G/DL (ref 32–36)
MCV RBC AUTO: 100 FL (ref 80–100)
MONOCYTES # BLD AUTO: 0.79 X10*3/UL (ref 0.05–0.8)
MONOCYTES NFR BLD AUTO: 12.2 %
NEUTROPHILS # BLD AUTO: 3.05 X10*3/UL (ref 1.6–5.5)
NEUTROPHILS NFR BLD AUTO: 47.3 %
NON HDL CHOLESTEROL: 82 MG/DL (ref 0–149)
NRBC BLD-RTO: 0 /100 WBCS (ref 0–0)
PLATELET # BLD AUTO: 211 X10*3/UL (ref 150–450)
POTASSIUM SERPL-SCNC: 4.3 MMOL/L (ref 3.5–5.3)
PROT SERPL-MCNC: 6.8 G/DL (ref 6.4–8.2)
RBC # BLD AUTO: 3.94 X10*6/UL (ref 4–5.2)
SODIUM SERPL-SCNC: 134 MMOL/L (ref 136–145)
TRIGL SERPL-MCNC: 99 MG/DL (ref 0–149)
TSH SERPL-ACNC: 1.4 MIU/L (ref 0.44–3.98)
VIT B12 SERPL-MCNC: 378 PG/ML (ref 211–911)
VLDL: 20 MG/DL (ref 0–40)
WBC # BLD AUTO: 6.5 X10*3/UL (ref 4.4–11.3)

## 2025-01-24 PROCEDURE — 83036 HEMOGLOBIN GLYCOSYLATED A1C: CPT

## 2025-01-24 PROCEDURE — 85025 COMPLETE CBC W/AUTO DIFF WBC: CPT

## 2025-01-24 PROCEDURE — 82607 VITAMIN B-12: CPT

## 2025-01-24 PROCEDURE — 84443 ASSAY THYROID STIM HORMONE: CPT

## 2025-01-24 PROCEDURE — 80053 COMPREHEN METABOLIC PANEL: CPT

## 2025-01-24 PROCEDURE — 80061 LIPID PANEL: CPT

## 2025-01-29 DIAGNOSIS — E78.5 HYPERLIPIDEMIA, UNSPECIFIED HYPERLIPIDEMIA TYPE: ICD-10-CM

## 2025-01-29 RX ORDER — LOVASTATIN 20 MG/1
20 TABLET ORAL NIGHTLY
Qty: 90 TABLET | Refills: 3 | Status: SHIPPED | OUTPATIENT
Start: 2025-01-29

## 2025-01-29 ASSESSMENT — ENCOUNTER SYMPTOMS: NERVOUS/ANXIOUS: 1

## 2025-02-04 ENCOUNTER — APPOINTMENT (OUTPATIENT)
Age: 85
End: 2025-02-04
Payer: MEDICARE

## 2025-02-04 VITALS
OXYGEN SATURATION: 94 % | HEART RATE: 66 BPM | WEIGHT: 145.7 LBS | DIASTOLIC BLOOD PRESSURE: 74 MMHG | HEIGHT: 66 IN | BODY MASS INDEX: 23.42 KG/M2 | SYSTOLIC BLOOD PRESSURE: 138 MMHG

## 2025-02-04 DIAGNOSIS — E11.9 TYPE 2 DIABETES MELLITUS WITHOUT COMPLICATION, WITHOUT LONG-TERM CURRENT USE OF INSULIN (MULTI): Primary | ICD-10-CM

## 2025-02-04 DIAGNOSIS — I10 ESSENTIAL HYPERTENSION: ICD-10-CM

## 2025-02-04 DIAGNOSIS — E03.9 HYPOTHYROIDISM (ACQUIRED): ICD-10-CM

## 2025-02-04 DIAGNOSIS — E78.5 HYPERLIPIDEMIA, UNSPECIFIED HYPERLIPIDEMIA TYPE: ICD-10-CM

## 2025-02-04 DIAGNOSIS — N18.2 CKD (CHRONIC KIDNEY DISEASE) STAGE 2, GFR 60-89 ML/MIN: ICD-10-CM

## 2025-02-04 PROCEDURE — 1036F TOBACCO NON-USER: CPT | Performed by: FAMILY MEDICINE

## 2025-02-04 PROCEDURE — 1160F RVW MEDS BY RX/DR IN RCRD: CPT | Performed by: FAMILY MEDICINE

## 2025-02-04 PROCEDURE — 99214 OFFICE O/P EST MOD 30 MIN: CPT | Performed by: FAMILY MEDICINE

## 2025-02-04 PROCEDURE — G2211 COMPLEX E/M VISIT ADD ON: HCPCS | Performed by: FAMILY MEDICINE

## 2025-02-04 PROCEDURE — 1159F MED LIST DOCD IN RCRD: CPT | Performed by: FAMILY MEDICINE

## 2025-02-04 PROCEDURE — 3078F DIAST BP <80 MM HG: CPT | Performed by: FAMILY MEDICINE

## 2025-02-04 PROCEDURE — 3075F SYST BP GE 130 - 139MM HG: CPT | Performed by: FAMILY MEDICINE

## 2025-02-04 ASSESSMENT — ENCOUNTER SYMPTOMS: NERVOUS/ANXIOUS: 1

## 2025-02-04 NOTE — PROGRESS NOTES
Subjective   Debo Rodriguez is a 84 y.o. female who presents for Follow-up (6 mo follow up).  Here for follow up HTN, DM, high chol, GERD, osteoporosis, hypothyroidism, JOVANNA on CPAP - tolerating well.  She has a h/o GBS in 2009.  She does have some issues with her feet/legs.      Diabetes  She has type 2 diabetes mellitus. No MedicAlert identification noted. The initial diagnosis of diabetes was made 15 years ago. Hypoglycemia symptoms include nervousness/anxiousness and sleepiness. Associated symptoms include foot paresthesias. Symptoms are stable. Diabetic complications include heart disease, nephropathy and PVD. Risk factors for coronary artery disease include hypertension. Current diabetic treatment includes oral agent (triple therapy). Her weight is stable. She is following a generally healthy diet. Meal planning includes avoidance of concentrated sweets. She has not had a previous visit with a dietitian. She participates in exercise three times a week. She monitors blood glucose at home 3-4 x per week. Blood glucose monitoring compliance is inadequate. Her home blood glucose trend is decreasing steadily. Her breakfast blood glucose is taken between 9-10 am. Her breakfast blood glucose range is generally 140-180 mg/dl. Her lunch blood glucose is taken between 1-2 pm. Her dinner blood glucose is taken between 7-8 pm. Her bedtime blood glucose is taken between 10-11 pm. She does not see a podiatrist.Eye exam is current.           Objective   Visit Vitals  /74   Pulse 66      Physical Exam  Vitals reviewed.   Constitutional:       General: She is not in acute distress.  Cardiovascular:      Rate and Rhythm: Normal rate and regular rhythm.      Heart sounds: No murmur heard.  Pulmonary:      Effort: Pulmonary effort is normal. No respiratory distress.      Breath sounds: Normal breath sounds.   Skin:     General: Skin is warm and dry.   Neurological:      General: No focal deficit present.      Mental  Status: She is alert. Mental status is at baseline.        Latest Reference Range & Units 01/24/25 10:53   GLUCOSE 74 - 99 mg/dL 149 (H)   SODIUM 136 - 145 mmol/L 134 (L)   POTASSIUM 3.5 - 5.3 mmol/L 4.3   CHLORIDE 98 - 107 mmol/L 97 (L)   Bicarbonate 21 - 32 mmol/L 30   Anion Gap 10 - 20 mmol/L 11   Blood Urea Nitrogen 6 - 23 mg/dL 22   Creatinine 0.50 - 1.05 mg/dL 0.74   EGFR >60 mL/min/1.73m*2 81   Calcium 8.6 - 10.3 mg/dL 10.1   Albumin 3.4 - 5.0 g/dL 4.3   Alkaline Phosphatase 33 - 136 U/L 54   ALT 7 - 45 U/L 15   AST 9 - 39 U/L 17   Bilirubin Total 0.0 - 1.2 mg/dL 0.5   HDL CHOLESTEROL mg/dL 62.0   Cholesterol/HDL Ratio  2.3   LDL Calculated <=99 mg/dL 62   VLDL 0 - 40 mg/dL 20   TRIGLYCERIDES 0 - 149 mg/dL 99   Non HDL Cholesterol 0 - 149 mg/dL 82   Total Protein 6.4 - 8.2 g/dL 6.8   CHOLESTEROL 0 - 199 mg/dL 144   Vitamin B12 211 - 911 pg/mL 378   Hemoglobin A1C See comment % 7.4 (H)   Estimated Average Glucose Not Established mg/dL 166   Thyroid Stimulating Hormone 0.44 - 3.98 mIU/L 1.40   WBC 4.4 - 11.3 x10*3/uL 6.5   nRBC 0.0 - 0.0 /100 WBCs 0.0   RBC 4.00 - 5.20 x10*6/uL 3.94 (L)   HEMOGLOBIN 12.0 - 16.0 g/dL 13.3   HEMATOCRIT 36.0 - 46.0 % 39.2   MCV 80 - 100 fL 100   MCH 26.0 - 34.0 pg 33.8   MCHC 32.0 - 36.0 g/dL 33.9   RED CELL DISTRIBUTION WIDTH 11.5 - 14.5 % 11.8   Platelets 150 - 450 x10*3/uL 211   Neutrophils % 40.0 - 80.0 % 47.3   Immature Granulocytes %, Automated 0.0 - 0.9 % 0.2   Lymphocytes % 13.0 - 44.0 % 39.2   Monocytes % 2.0 - 10.0 % 12.2   Eosinophils % 0.0 - 6.0 % 0.9   Basophils % 0.0 - 2.0 % 0.2   Neutrophils Absolute 1.60 - 5.50 x10*3/uL 3.05   Immature Granulocytes Absolute, Automated 0.00 - 0.50 x10*3/uL 0.01   Lymphocytes Absolute 0.80 - 3.00 x10*3/uL 2.53   Monocytes Absolute 0.05 - 0.80 x10*3/uL 0.79   Eosinophils Absolute 0.00 - 0.40 x10*3/uL 0.06   Basophils Absolute 0.00 - 0.10 x10*3/uL 0.01   (H): Data is abnormally high  (L): Data is abnormally low    Assessment/Plan    Problem List Items Addressed This Visit       Type 2 diabetes mellitus - Primary    Relevant Orders    Albumin-Creatinine Ratio, Urine Random    CBC and Auto Differential    Comprehensive Metabolic Panel    Hemoglobin A1C    Lipid Panel    TSH with reflex to Free T4 if abnormal    Vitamin B12    Hypothyroidism (acquired)    Relevant Orders    Albumin-Creatinine Ratio, Urine Random    CBC and Auto Differential    Comprehensive Metabolic Panel    Hemoglobin A1C    Lipid Panel    TSH with reflex to Free T4 if abnormal    Vitamin B12    Hyperlipidemia    Relevant Orders    Albumin-Creatinine Ratio, Urine Random    CBC and Auto Differential    Comprehensive Metabolic Panel    Hemoglobin A1C    Lipid Panel    TSH with reflex to Free T4 if abnormal    Vitamin B12    Essential hypertension    Relevant Orders    Albumin-Creatinine Ratio, Urine Random    CBC and Auto Differential    Comprehensive Metabolic Panel    Hemoglobin A1C    Lipid Panel    TSH with reflex to Free T4 if abnormal    Vitamin B12    CKD (chronic kidney disease) stage 2, GFR 60-89 ml/min    Relevant Orders    Albumin-Creatinine Ratio, Urine Random    CBC and Auto Differential    Comprehensive Metabolic Panel    Hemoglobin A1C    Lipid Panel    TSH with reflex to Free T4 if abnormal    Vitamin B12          Rizwana Daley MD

## 2025-03-26 DIAGNOSIS — E03.9 HYPOTHYROIDISM (ACQUIRED): Primary | ICD-10-CM

## 2025-03-26 RX ORDER — LEVOTHYROXINE SODIUM 75 UG/1
75 TABLET ORAL
Qty: 90 TABLET | Refills: 0 | Status: SHIPPED | OUTPATIENT
Start: 2025-03-26 | End: 2025-03-27 | Stop reason: SDUPTHER

## 2025-03-27 DIAGNOSIS — E03.9 HYPOTHYROIDISM (ACQUIRED): ICD-10-CM

## 2025-03-27 RX ORDER — LEVOTHYROXINE SODIUM 75 UG/1
75 TABLET ORAL
Qty: 90 TABLET | Refills: 0 | Status: SHIPPED | OUTPATIENT
Start: 2025-03-27 | End: 2026-03-27

## 2025-04-07 ENCOUNTER — HOSPITAL ENCOUNTER (EMERGENCY)
Facility: HOSPITAL | Age: 85
Discharge: HOME | End: 2025-04-07
Payer: MEDICARE

## 2025-04-07 ENCOUNTER — APPOINTMENT (OUTPATIENT)
Dept: VASCULAR MEDICINE | Facility: HOSPITAL | Age: 85
End: 2025-04-07
Payer: MEDICARE

## 2025-04-07 ENCOUNTER — TELEPHONE (OUTPATIENT)
Age: 85
End: 2025-04-07
Payer: MEDICARE

## 2025-04-07 VITALS
RESPIRATION RATE: 17 BRPM | BODY MASS INDEX: 23.4 KG/M2 | OXYGEN SATURATION: 98 % | TEMPERATURE: 97.3 F | SYSTOLIC BLOOD PRESSURE: 116 MMHG | DIASTOLIC BLOOD PRESSURE: 63 MMHG | WEIGHT: 145 LBS | HEART RATE: 82 BPM

## 2025-04-07 DIAGNOSIS — I10 PRIMARY HYPERTENSION: ICD-10-CM

## 2025-04-07 DIAGNOSIS — M79.605 PAIN OF LEFT LOWER EXTREMITY: Primary | ICD-10-CM

## 2025-04-07 DIAGNOSIS — M79.662 PAIN IN LEFT LOWER LEG: ICD-10-CM

## 2025-04-07 PROCEDURE — 93971 EXTREMITY STUDY: CPT | Performed by: STUDENT IN AN ORGANIZED HEALTH CARE EDUCATION/TRAINING PROGRAM

## 2025-04-07 PROCEDURE — 99284 EMERGENCY DEPT VISIT MOD MDM: CPT | Mod: 25

## 2025-04-07 PROCEDURE — 93971 EXTREMITY STUDY: CPT

## 2025-04-07 ASSESSMENT — PAIN - FUNCTIONAL ASSESSMENT: PAIN_FUNCTIONAL_ASSESSMENT: 0-10

## 2025-04-07 ASSESSMENT — PAIN SCALES - GENERAL: PAINLEVEL_OUTOF10: 2

## 2025-04-07 ASSESSMENT — COLUMBIA-SUICIDE SEVERITY RATING SCALE - C-SSRS
1. IN THE PAST MONTH, HAVE YOU WISHED YOU WERE DEAD OR WISHED YOU COULD GO TO SLEEP AND NOT WAKE UP?: NO
6. HAVE YOU EVER DONE ANYTHING, STARTED TO DO ANYTHING, OR PREPARED TO DO ANYTHING TO END YOUR LIFE?: NO
2. HAVE YOU ACTUALLY HAD ANY THOUGHTS OF KILLING YOURSELF?: NO

## 2025-04-07 ASSESSMENT — PAIN DESCRIPTION - LOCATION: LOCATION: LEG

## 2025-04-07 NOTE — ED PROVIDER NOTES
Chief Complaint   Patient presents with    Leg Pain     Left leg pain, swelling since yesterday. HX of blood clots. Not on blood thinner.         Patient History    Past Medical History:   Diagnosis Date    Allergic     Arthritis     Asymptomatic menopausal state     History of menopause    Chronic kidney disease     Diabetes mellitus (Multi)     Disease of thyroid gland     Encounter for full-term uncomplicated delivery     Normal vaginal delivery    Encounter for gynecological examination (general) (routine) without abnormal findings     Pap test, as part of routine gynecological examination    Glaucoma     Guillain Barré syndrome (Multi)     Heart murmur     Hypertension     Inflammatory bowel disease     Other conditions influencing health status     Menstruation    Personal history of other medical treatment     H/O bone density study    Personal history of other medical treatment     History of mammogram    Scoliosis     Sleep apnea       Past Surgical History:   Procedure Laterality Date    EYE SURGERY      OTHER SURGICAL HISTORY  12/03/2021    Cataract surgery      Family History   Problem Relation Name Age of Onset    Accidental death Father Ernesto Curtis       Social History     Social History Narrative    Not on file      Allergies   Allergen Reactions    Erythromycin Anaphylaxis, Palpitations and Unknown    Zithromax [Azithromycin] Itching     ointment    Flu Vaccine 2011 (36 Mos+)(Pf) Unknown    Sulfa (Sulfonamide Antibiotics) Other    Penicillins Hives and Unknown        PMH: Reviewed  PSH: Reviewed  Social History: Reviewed.   Allergies reviewed.     HPI: Debo Rodriguez is a 84 y.o. female who presents to the ED today accompanied by her  with complaints of left leg pain and swelling.  Noticed it starting yesterday.  History of DVT 15 years ago, believes it was in the left lower extremity.  Was on Coumadin for a year after the DVT.  States it developed after she had been on bedrest and  hospitalized.  No chest pain or shortness of breath.    PHYSICAL EXAM:    GENERAL: Vitals noted, no distress. Alert and oriented x 3. Non-toxic.       HEAD: Normocephalic, atraumatic. Pupils equally round and reactive to light. EOMI.     NECK: Supple. No midline or paraspinal tenderness through full range of motion.      CARDIAC: Regular rate, rhythm. No murmurs or rubs.    RESPIRATORY: Lungs clear and equal bilaterally. No respiratory distress.     MUSCULOSKELETAL & SKIN: LLE-skin is warm, dry, and intact. No rash/lesions. No significant peripheral edema other either lower extremity. Left calf soft, mildly tender to touch.     NEURO: No focal neurologic deficits, acting appropriately.     Labs Reviewed - No data to display     Vascular US lower extremity venous duplex left          PRELIMINARY CONCLUSIONS:     Right Lower Venous: The right common femoral vein demonstrates normal spontaneous and respirophasic flow.  Left Lower Venous: No evidence of acute deep vein thrombus visualized in the left lower extremity.        Medical Decision Making         ED COURSE: This patient was seen and examined by myself independently. Sent for US imaging of the left leg. Repeat BP down to 127/64. US is negative as noted above. Uncertain etiology of her pain/swelling today. She's reassured. Recommended PCP follow-up care. She is discharged home in a stable condition with computer instructions given and is encouraged to return to the ER for any new or worsening symptoms.       DIAGNOSTIC IMPRESSION: #1 left leg pain     Rizwana Joyner, TIMUR-CNP  04/07/25 [id number]

## 2025-04-07 NOTE — TELEPHONE ENCOUNTER
PATIENT WITH  PAIN IN LEFT  CALF.   SWELLING, HURTS TO WALK OR STAND ON IT.   NO INJURY.   ADVISED PATIENT TO HAVE IEVALUATED  AT ER..

## 2025-05-06 ENCOUNTER — APPOINTMENT (OUTPATIENT)
Dept: URBAN - METROPOLITAN AREA CLINIC 204 | Age: 85
Setting detail: DERMATOLOGY
End: 2025-05-07

## 2025-05-06 DIAGNOSIS — L82.1 OTHER SEBORRHEIC KERATOSIS: ICD-10-CM

## 2025-05-06 DIAGNOSIS — L81.4 OTHER MELANIN HYPERPIGMENTATION: ICD-10-CM

## 2025-05-06 DIAGNOSIS — L82.0 INFLAMED SEBORRHEIC KERATOSIS: ICD-10-CM

## 2025-05-06 DIAGNOSIS — L57.0 ACTINIC KERATOSIS: ICD-10-CM

## 2025-05-06 PROBLEM — D48.5 NEOPLASM OF UNCERTAIN BEHAVIOR OF SKIN: Status: ACTIVE | Noted: 2025-05-06

## 2025-05-06 PROCEDURE — OTHER MIPS QUALITY: OTHER

## 2025-05-06 PROCEDURE — 17003 DESTRUCT PREMALG LES 2-14: CPT

## 2025-05-06 PROCEDURE — 11103 TANGNTL BX SKIN EA SEP/ADDL: CPT

## 2025-05-06 PROCEDURE — OTHER SUNSCREEN RECOMMENDATIONS: OTHER

## 2025-05-06 PROCEDURE — OTHER BIOPSY BY SHAVE METHOD: OTHER

## 2025-05-06 PROCEDURE — OTHER LIQUID NITROGEN: OTHER

## 2025-05-06 PROCEDURE — 11102 TANGNTL BX SKIN SINGLE LES: CPT

## 2025-05-06 PROCEDURE — OTHER REASSURANCE: OTHER

## 2025-05-06 PROCEDURE — 17000 DESTRUCT PREMALG LESION: CPT | Mod: 59

## 2025-05-06 PROCEDURE — 99213 OFFICE O/P EST LOW 20 MIN: CPT | Mod: 25

## 2025-05-06 ASSESSMENT — LOCATION ZONE DERM
LOCATION ZONE: ARM
LOCATION ZONE: FACE
LOCATION ZONE: SCALP
LOCATION ZONE: TRUNK

## 2025-05-06 ASSESSMENT — LOCATION DETAILED DESCRIPTION DERM
LOCATION DETAILED: RIGHT INFERIOR LATERAL MALAR CHEEK
LOCATION DETAILED: POSTERIOR MID-PARIETAL SCALP
LOCATION DETAILED: LEFT MEDIAL SUPERIOR CHEST
LOCATION DETAILED: LEFT POSTERIOR SHOULDER
LOCATION DETAILED: SUPERIOR THORACIC SPINE
LOCATION DETAILED: RIGHT POSTERIOR SHOULDER

## 2025-05-06 ASSESSMENT — LOCATION SIMPLE DESCRIPTION DERM
LOCATION SIMPLE: POSTERIOR SCALP
LOCATION SIMPLE: CHEST
LOCATION SIMPLE: LEFT SHOULDER
LOCATION SIMPLE: UPPER BACK
LOCATION SIMPLE: RIGHT SHOULDER
LOCATION SIMPLE: RIGHT CHEEK

## 2025-05-21 DIAGNOSIS — E03.9 HYPOTHYROIDISM (ACQUIRED): ICD-10-CM

## 2025-05-21 DIAGNOSIS — E11.9 TYPE 2 DIABETES MELLITUS WITHOUT COMPLICATION, WITHOUT LONG-TERM CURRENT USE OF INSULIN: ICD-10-CM

## 2025-05-22 RX ORDER — LEVOTHYROXINE SODIUM 75 UG/1
75 TABLET ORAL DAILY
Qty: 90 TABLET | Refills: 3 | Status: SHIPPED | OUTPATIENT
Start: 2025-05-22

## 2025-05-22 RX ORDER — METFORMIN HYDROCHLORIDE 500 MG/1
TABLET, EXTENDED RELEASE ORAL
Qty: 90 TABLET | Refills: 3 | Status: SHIPPED | OUTPATIENT
Start: 2025-05-22

## 2025-06-09 ENCOUNTER — APPOINTMENT (OUTPATIENT)
Dept: URBAN - METROPOLITAN AREA SURGERY 9 | Age: 85
Setting detail: DERMATOLOGY
End: 2025-06-10

## 2025-06-09 PROBLEM — D03.4 MELANOMA IN SITU OF SCALP AND NECK: Status: ACTIVE | Noted: 2025-06-09

## 2025-06-09 PROCEDURE — OTHER FRAME PROCEDURE: OTHER

## 2025-06-09 PROCEDURE — OTHER MIPS QUALITY: OTHER

## 2025-06-09 PROCEDURE — 11626 EXC S/N/H/F/G MAL+MRG >4 CM: CPT

## 2025-06-09 PROCEDURE — OTHER CONSULTATION EXCISION: OTHER

## 2025-06-19 ENCOUNTER — APPOINTMENT (OUTPATIENT)
Dept: URBAN - METROPOLITAN AREA SURGERY 9 | Age: 85
Setting detail: DERMATOLOGY
End: 2025-06-19

## 2025-06-19 DIAGNOSIS — Z48.817 ENCOUNTER FOR SURGICAL AFTERCARE FOLLOWING SURGERY ON THE SKIN AND SUBCUTANEOUS TISSUE: ICD-10-CM

## 2025-06-19 PROCEDURE — OTHER POST-OP WOUND CHECK: OTHER

## 2025-06-19 PROCEDURE — 99024 POSTOP FOLLOW-UP VISIT: CPT

## 2025-06-19 ASSESSMENT — LOCATION SIMPLE DESCRIPTION DERM: LOCATION SIMPLE: SCALP

## 2025-06-19 ASSESSMENT — LOCATION DETAILED DESCRIPTION DERM: LOCATION DETAILED: RIGHT SUPERIOR PARIETAL SCALP

## 2025-06-19 ASSESSMENT — LOCATION ZONE DERM: LOCATION ZONE: SCALP

## 2025-06-25 DIAGNOSIS — I10 ESSENTIAL HYPERTENSION: ICD-10-CM

## 2025-06-26 ENCOUNTER — OFFICE VISIT (OUTPATIENT)
Dept: CARDIOLOGY | Facility: CLINIC | Age: 85
End: 2025-06-26
Payer: MEDICARE

## 2025-06-26 ENCOUNTER — APPOINTMENT (OUTPATIENT)
Dept: CARDIOLOGY | Facility: CLINIC | Age: 85
End: 2025-06-26
Payer: MEDICARE

## 2025-06-26 VITALS
HEIGHT: 66 IN | BODY MASS INDEX: 22.28 KG/M2 | OXYGEN SATURATION: 94 % | WEIGHT: 138.6 LBS | SYSTOLIC BLOOD PRESSURE: 150 MMHG | DIASTOLIC BLOOD PRESSURE: 66 MMHG | HEART RATE: 65 BPM

## 2025-06-26 DIAGNOSIS — R42 DIZZINESS: ICD-10-CM

## 2025-06-26 DIAGNOSIS — R01.1 MURMUR: ICD-10-CM

## 2025-06-26 DIAGNOSIS — I10 HYPERTENSION, UNSPECIFIED TYPE: ICD-10-CM

## 2025-06-26 DIAGNOSIS — I35.1 MODERATE AORTIC REGURGITATION: ICD-10-CM

## 2025-06-26 DIAGNOSIS — I35.0 NONRHEUMATIC AORTIC (VALVE) STENOSIS: ICD-10-CM

## 2025-06-26 DIAGNOSIS — E11.9 DIABETES MELLITUS TYPE II, NON INSULIN DEPENDENT (MULTI): ICD-10-CM

## 2025-06-26 DIAGNOSIS — R06.02 SHORTNESS OF BREATH: Primary | ICD-10-CM

## 2025-06-26 DIAGNOSIS — E78.5 HYPERLIPIDEMIA, UNSPECIFIED HYPERLIPIDEMIA TYPE: ICD-10-CM

## 2025-06-26 PROCEDURE — 93000 ELECTROCARDIOGRAM COMPLETE: CPT | Performed by: STUDENT IN AN ORGANIZED HEALTH CARE EDUCATION/TRAINING PROGRAM

## 2025-06-26 PROCEDURE — 1159F MED LIST DOCD IN RCRD: CPT | Performed by: STUDENT IN AN ORGANIZED HEALTH CARE EDUCATION/TRAINING PROGRAM

## 2025-06-26 PROCEDURE — 3078F DIAST BP <80 MM HG: CPT | Performed by: STUDENT IN AN ORGANIZED HEALTH CARE EDUCATION/TRAINING PROGRAM

## 2025-06-26 PROCEDURE — 3077F SYST BP >= 140 MM HG: CPT | Performed by: STUDENT IN AN ORGANIZED HEALTH CARE EDUCATION/TRAINING PROGRAM

## 2025-06-26 PROCEDURE — 99214 OFFICE O/P EST MOD 30 MIN: CPT | Performed by: STUDENT IN AN ORGANIZED HEALTH CARE EDUCATION/TRAINING PROGRAM

## 2025-06-26 PROCEDURE — 1160F RVW MEDS BY RX/DR IN RCRD: CPT | Performed by: STUDENT IN AN ORGANIZED HEALTH CARE EDUCATION/TRAINING PROGRAM

## 2025-06-26 PROCEDURE — 1036F TOBACCO NON-USER: CPT | Performed by: STUDENT IN AN ORGANIZED HEALTH CARE EDUCATION/TRAINING PROGRAM

## 2025-06-26 RX ORDER — AMLODIPINE BESYLATE 5 MG/1
5 TABLET ORAL DAILY
Qty: 90 TABLET | Refills: 3 | Status: SHIPPED | OUTPATIENT
Start: 2025-06-26

## 2025-06-26 NOTE — PROGRESS NOTES
Chief Complaint   Patient presents with    1 year f/u     HPI:  I was requested by Dr. Daley to evaluate this patient in consultation for cardiac assessment.     Mrs. Debo Rodriguez is an 84 y.o. year old never smoker diabetic female patient with prior medical history significant for hypertension, diabetes, hyperlipidemia, moderate aortic stenosis, hypothyroidism. She is currently oligosymptomatic. She endorses shortness of breath on exertion (walking > 10 min) and some episodes of dizziness. She denies  Patient denies chest pain, palpitations, leg edema, headaches, fever, chills, orthopnea, paroxysmal nocturnal dyspnea or syncope.   Serial echocardiograms performed 2022 and 2023 showed likely moderate aortic regurgitation.   EKG shows normal sinus rhythm with incomplete RBBB and no signs of ACS.  Echo (01/2023):   1. Left ventricular systolic function is normal with a 60% estimated ejection fraction.  2. Moderate aortic valve regurgitation.  3. No significant changes compared to prior echocardiogram dated 1/3/2022.    Patient returns today stating that is asymptomatic from the cardiovascular standpoint and feeling fine. Has been compliant to the medication. Denies chest pain, shortness of breath, palpitations, leg edema, lightheadedness, headaches, fever, chills, bleeding, orthopnea, paroxysmal nocturnal dyspnea or syncope.      Past Medical History  Medical History[1]    Past Surgical History  Surgical History[2]    Past Family History  Family History[3]    Allergy History  Allergies[4]    Past Social History  Social History[5]    Tobacco Use History[6]    Review of Systems:  Constitutional: Denies any fever or chills  Eyes: Denies any eye pain or blurry vision  ENT: Denies any ear pain or hearing loss  Cardiovascular: The heart rate is not slow, the heart rate is not fast  Respiratory: Denies any asthma/wheezing  Gastrointestinal: Denies any tammy colored stools or fatty food intolerance  Genitourinary:  "Denies any blood in the urine or pelvic pain  Musculoskeletal: Denies any swelling in the joints or difficulty walking  Skin: Denies any skin lumps or skin lesions  Neurological: Denies any dizziness/tingling    Objective Data:  Last Recorded Vitals:  Vitals:    06/26/25 1251   BP: 150/66   Pulse: 65   SpO2: 94%   Weight: 62.9 kg (138 lb 9.6 oz)   Height: 1.676 m (5' 6\")       Last Labs:  CBC - 1/24/2025: 10:53 AM  6.5 13.3 211    39.2      CMP - 1/24/2025: 10:53 AM  10.1 6.8 17 --- 0.5   _ 4.3 15 54      PTT - No results in last year.  _   _ _     HGBA1C   Date/Time Value Ref Range Status   01/24/2025 10:53 AM 7.4 See comment % Final   07/23/2024 07:46 AM 7.1 see below % Final     LDLCALC   Date/Time Value Ref Range Status   01/24/2025 10:53 AM 62 <=99 mg/dL Final     Comment:                                 Near   Borderline      AGE      Desirable  Optimal    High     High     Very High     0-19 Y     0 - 109     ---    110-129   >/= 130     ----    20-24 Y     0 - 119     ---    120-159   >/= 160     ----      >24 Y     0 -  99   100-129  130-159   160-189     >/=190     07/23/2024 07:46 AM 64 <=99 mg/dL Final     Comment:                                 Near   Borderline      AGE      Desirable  Optimal    High     High     Very High     0-19 Y     0 - 109     ---    110-129   >/= 130     ----    20-24 Y     0 - 119     ---    120-159   >/= 160     ----      >24 Y     0 -  99   100-129  130-159   160-189     >/=190     02/23/2024 09:20 AM 59 <=99 mg/dL Final     Comment:                                 Near   Borderline      AGE      Desirable  Optimal    High     High     Very High     0-19 Y     0 - 109     ---    110-129   >/= 130     ----    20-24 Y     0 - 119     ---    120-159   >/= 160     ----      >24 Y     0 -  99   100-129  130-159   160-189     >/=190       VLDL   Date/Time Value Ref Range Status   01/24/2025 10:53 AM 20 0 - 40 mg/dL Final   07/23/2024 07:46 AM 23 0 - 40 mg/dL Final   02/23/2024 " 09:20 AM 22 0 - 40 mg/dL Final        Patient Medications:  Encounter Medications[7]    Physical Exam:  General: alert, oriented and in no acute distress  HEENT: NC/AT; EOMI; PERRLA, external ear is normal  Neck: supple; trachea midline; no masses; no JVD  Chest: lungs clear to auscultation bilaterally; no wheezing  CVS: regular rhythm, S1S2 normal, systolic murmur 2+/6+ RUSB, no radiation.  Abdomen: Soft, non-tender, non-distension, no organomegaly  Extremities: no clubbing/cyanosis/edema  Neuro: Grossly intact     Psychiatric: Normal mood and affect    Past Cardiology Results (Last 3 Years):  EKG:  ECG 12 lead (Clinic Performed) 12/15/2023    Echo:  Echo Results:  Transthoracic Echo (TTE) Complete 07/03/2024    [place] [address]  Phone [phone], Fax [phone]    TRANSTHORACIC ECHOCARDIOGRAM REPORT    Patient Name:      LUCINDA MARR SHARI     Reading Physician:    Tahir Gomez MD  Study Date:        7/3/2024              Ordering Provider:    Tahir GOMEZ  MRN/PID:           [id number]              Fellow:  Accession#:        [id number]          Nurse:  Date of Birth/Age: 1940 / 83 years Sonographer:          Edwin Jensen RDCS  Gender:            F                     Additional Staff:  Height:            167.64 cm             Admit Date:  Weight:            65.77 kg              Admission Status:     Inpatient -  Routine  BSA / BMI:         1.74 m2 / 23.40 kg/m2 Department Location:  [facility]  Blood Pressure: 178 /65 mmHg    Study Type:    TRANSTHORACIC ECHO (TTE) COMPLETE  Diagnosis/ICD: Nonrheumatic aortic (valve) insufficiency-I35.1  CPT Codes:     Echo Complete w Full [id number]  Study Detail: The following Echo studies were performed: 2D, M-Mode, Doppler and  color flow.      PHYSICIAN INTERPRETATION:  Left Ventricle: Left ventricular ejection fraction is normal, calculated by Hall's biplane at 64%.  There are no regional wall motion abnormalities. The left ventricular cavity size is normal. Spectral Doppler shows an impaired relaxation pattern of left ventricular diastolic filling.  Left Atrium: The left atrium is normal in size.  Right Ventricle: The right ventricle is normal in size. There is normal right ventricular global systolic function.  Right Atrium: The right atrium is normal in size.  Aortic Valve: The aortic valve is trileaflet. The aortic valve dimensionless index is 0.82. There is moderate aortic valve regurgitation. The peak instantaneous gradient of the aortic valve is 9.2 mmHg. The mean gradient of the aortic valve is 4.0 mmHg.  Mitral Valve: The mitral valve is abnormal. There is trace mitral valve regurgitation. Calcified mitral annulus and leaflets.  Tricuspid Valve: The tricuspid valve is structurally normal. There is trace tricuspid regurgitation.  Pulmonic Valve: The pulmonic valve is not well visualized. There is no indication of pulmonic valve regurgitation.  Pericardium: There is no pericardial effusion noted.  Aorta: The aortic root is normal.  Systemic Veins: The inferior vena cava appears to be of normal size. There is IVC inspiratory collapse greater than 50%.  In comparison to the previous echocardiogram(s): Compared with study dated 1/19/2023, no significant change.      CONCLUSIONS:  1. Left ventricular ejection fraction is normal, calculated by Hall's biplane at 64%.  2. Spectral Doppler shows an impaired relaxation pattern of left ventricular diastolic filling.  3. There is normal right ventricular global systolic function.  4. Moderate aortic valve regurgitation.    QUANTITATIVE DATA SUMMARY:  2D MEASUREMENTS:  Normal Ranges:  Ao Root d:     3.20 cm   (2.0-3.7cm)  LAs:           2.90 cm   (2.7-4.0cm)  IVSd:          1.06 cm   (0.6-1.1cm)  LVPWd:         0.91 cm   (0.6-1.1cm)  LVIDd:         4.26 cm   (3.9-5.9cm)  LVIDs:         2.57 cm  LV Mass Index: 78.8 g/m2  LV % FS         39.7 %    LA VOLUME:  Normal Ranges:  LA Vol A4C:        19.6 ml    (22+/-6mL/m2)  LA Vol A2C:        29.3 ml  LA Vol BP:         25.0 ml  LA Vol Index A4C:  11.3ml/m2  LA Vol Index A2C:  16.8 ml/m2  LA Vol Index BP:   14.3 ml/m2  LA Area A4C:       10.4 cm2  LA Area A2C:       12.2 cm2  LA Major Axis A4C: 4.7 cm  LA Major Axis A2C: 4.3 cm  LA Volume Index:   11.0 ml/m2  LA Vol A4C:        19.1 ml  LA Vol A2C:        28.3 ml    LV SYSTOLIC FUNCTION BY 2D PLANIMETRY (MOD):  Normal Ranges:  EF-A4C View:    63 % (>=55%)  EF-A2C View:    64 %  EF-Biplane:     64 %  LV EF Reported: 64 %    LV DIASTOLIC FUNCTION:  Normal Ranges:  MV Peak E:    0.57 m/s  (0.7-1.2 m/s)  MV Peak A:    0.80 m/s  (0.42-0.7 m/s)  E/A Ratio:    0.71      (1.0-2.2)  MV e'         0.054 m/s (>8.0)  MV lateral e' 0.06 m/s  MV medial e'  0.05 m/s  E/e' Ratio:   10.44     (<8.0)    MITRAL VALVE:  Normal Ranges:  MV DT: 190 msec (150-240msec)    AORTIC VALVE:  Normal Ranges:  AoV Vmax:                1.52 m/s (<=1.7m/s)  AoV Peak P.2 mmHg (<20mmHg)  AoV Mean P.0 mmHg (1.7-11.5mmHg)  LVOT Max Herbert:            1.15 m/s (<=1.1m/s)  AoV VTI:                 34.10 cm (18-25cm)  LVOT VTI:                28.10 cm  LVOT Diameter:           1.80 cm  (1.8-2.4cm)  AoV Area, VTI:           2.10 cm2 (2.5-5.5cm2)  AoV Area,Vmax:           1.93 cm2 (2.5-4.5cm2)  AoV Dimensionless Index: 0.82    AORTIC INSUFFICIENCY:  AI Vmax:       4.39 m/s  AI Half-time:  550 msec  AI Decel Rate: 211.50 cm/s2      RIGHT VENTRICLE:  RV Basal 3.71 cm  RV Mid   2.39 cm  RV Major 7.1 cm  TAPSE:   20.4 mm  RV s'    0.14 m/s    TRICUSPID VALVE/RVSP:  Normal Ranges:  Peak TR Velocity: 2.15 m/s  RV Syst Pressure: 21.5 mmHg (< 30mmHg)      [id number] Jonathan Cabrera MD  Electronically signed on 7/3/2024 at 3:44:17 PM        ** Final **     Cath:  No results found for this or any previous visit from the past 1095 days.    CV NCDR CATHPCI V5 COLLECTION FORM    Stress Test:  No results found for this or any previous visit from the past 1095 days.    Cardiac Imaging:  No results found for this or any previous visit from the past 1095 days.       Assessment/Plan     In summary, Mrs. Debo Rodriguez is an 84 y.o. year old never smoker female patient with prior medical history significant for hypertension, diabetes, hyperlipidemia, moderate aortic stenosis, hypothyroidism. She is currently asymptomatic. Serial echocardiograms performed 2022 and 2023 showed likely moderate aortic regurgitation. She denies  Patient denies chest pain, shortness of breath, palpitations, leg edema, lightheadedness, headaches, fever, chills, orthopnea, paroxysmal nocturnal dyspnea or syncope.     # Moderate Aortic Stenosis:  - Patient currently oligosymptomatic.  - Reports shortness of breath on over exertion (walking > 10 min).  - EKG shows normal sinus rhythm with incomplete RBBB and no signs of ACS.  - Echo (01/2023):   1. Left ventricular systolic function is normal with a 60% estimated ejection fraction.  2. Moderate aortic valve regurgitation.  3. No significant changes compared to prior echocardiogram dated 1/3/2022.  - Will keep conservative treatment at this point. Patient is aware that she might need a procedure at some point.  - Keep ASA 81mg daily, Lovastatin 20mg daily.  - Follow up yearly with echo.     # Hypertension  - Controlled blood pressure.  - Keep home medication with Amlodipine 5mg daily, Lisinopril 20mg / hydrochlorothiazide 25mg daily, Carvedilol 6.25mg daily.  - Patient counseled to keep a healthy lifestyle including low-sodium diet.  - Goal of BP < 130/80mmHg.     # Diabetes  - Controlled by PCP  - A1c 7.5%  - Would suggest to start her on Metformin.     # Hypothyroidism  - Controlled by PCP  - Keep Levothyroxine 75mcg daily.     We have discussed the most common side effects of the prescribed medications, indications, drug interactions, risks, complications, and  alternatives of medications/therapeutics were explained and discussed. The patient has been requested to monitor closely for any untoward side effects or complications of medications. The patient has been strongly advised to be compliant with the recommendations, all the questions and concerns have been addressed. The patient has been also instructed to call, to return sooner or to go to the emergency department if symptoms persist or get worsen. The patient voiced understanding and denies any further questions at this time.     This note was transcribed using the Dragon Dictation system. There may be grammatical, punctuation, or verbiage errors that occur with voice recognition programs.     Counseling greater than 50% of visit regarding all cardiac issues.     Thank you, Dr. Jenkins, for allowing me to participate in the care of this patient. Please do not hesitate to contact me with any further questions or concerns.     Dario Huerta MD  Cardiology       [1]   Past Medical History:  Diagnosis Date    Allergic     Arthritis     Asymptomatic menopausal state     History of menopause    Chronic kidney disease     Diabetes mellitus (Multi)     Disease of thyroid gland     Encounter for full-term uncomplicated delivery     Normal vaginal delivery    Encounter for gynecological examination (general) (routine) without abnormal findings     Pap test, as part of routine gynecological examination    Glaucoma     Guillain Barré syndrome (Multi)     Heart murmur     Hypertension     Inflammatory bowel disease     Other conditions influencing health status     Menstruation    Personal history of other medical treatment     H/O bone density study    Personal history of other medical treatment     History of mammogram    Scoliosis     Sleep apnea    [2]   Past Surgical History:  Procedure Laterality Date    EYE SURGERY      OTHER SURGICAL HISTORY  12/03/2021    Cataract surgery   [3]   Family History  Problem  Relation Name Age of Onset    Accidental death Father Ernesto Curtis    [4]   Allergies  Allergen Reactions    Erythromycin Anaphylaxis, Palpitations and Unknown    Zithromax [Azithromycin] Itching     ointment    Flu Vaccine 2011 (36 Mos+)(Pf) Unknown    Sulfa (Sulfonamide Antibiotics) Other    Penicillins Hives and Unknown   [5]   Social History  Socioeconomic History    Marital status:    Tobacco Use    Smoking status: Never    Smokeless tobacco: Never   Vaping Use    Vaping status: Never Used   Substance and Sexual Activity    Alcohol use: Never    Drug use: Never    Sexual activity: Not Currently     Partners: Male     Social Drivers of Health     Financial Resource Strain: Low Risk  (11/7/2022)    Received from [facility]    Overall Financial Resource Strain (CARDIA)     Difficulty of Paying Living Expenses: Not hard at all   Food Insecurity: No Food Insecurity (11/7/2022)    Received from [facility]    Hunger Vital Sign     Worried About Running Out of Food in the Last Year: Never true     Ran Out of Food in the Last Year: Never true   Transportation Needs: No Transportation Needs (11/7/2022)    Received from [facility]    PRAPARE - Transportation     Lack of Transportation (Medical): No     Lack of Transportation (Non-Medical): No   Physical Activity: Insufficiently Active (11/7/2022)    Received from [facility]    Exercise Vital Sign     Days of Exercise per Week: 3 days     Minutes of Exercise per Session: 20 min   Stress: No Stress Concern Present (11/7/2022)    Received from [facility]    [employer] - Occupational Stress Questionnaire     Feeling of Stress : Not at all   Social Connections: Moderately Isolated (11/7/2022)    Received from [facility]    Social Connection and Isolation Panel [NHANES]     Frequency of Communication with Friends and Family: More than three times a week     Frequency of Social Gatherings with Friends and Family: More than three times a week      Attends Orthodox Services: Never     Active Member of Clubs or Organizations: No     Attends Club or Organization Meetings: Never     Marital Status:    Intimate Partner Violence: Not At Risk (11/7/2022)    Received from [facility]    Humiliation, Afraid, Rape, and Kick questionnaire     Fear of Current or Ex-Partner: No     Emotionally Abused: No     Physically Abused: No     Sexually Abused: No   Housing Stability: Low Risk  (11/7/2022)    Received from [facility]    Housing Stability Vital Sign     Unable to Pay for Housing in the Last Year: No     Number of Places Lived in the Last Year: 1     Unstable Housing in the Last Year: No   [6]   Social History  Tobacco Use   Smoking Status Never   Smokeless Tobacco Never   [7]   Outpatient Encounter Medications as of 6/26/2025   Medication Sig Dispense Refill    amLODIPine (Norvasc) 5 mg tablet TAKE 1 TABLET BY MOUTH ONCE  DAILY 90 tablet 3    ascorbic acid, vitamin C, 500 mg capsule Take by mouth.      aspirin 81 mg EC tablet Take 1 tablet (81 mg) by mouth once daily.      biotin 10 mg tablet Take 1 tablet (10 mg) by mouth once daily.      blood sugar diagnostic (Contour Next Test Strips) strip 1 strip once daily. 100 strip 2    calcium carbonate-vitamin D3 600 mg-5 mcg (200 unit) tablet Take 1 tablet by mouth once daily.      carvedilol (Coreg) 6.25 mg tablet TAKE 1 TABLET BY MOUTH TWICE  DAILY 180 tablet 3    cyanocobalamin (Vitamin B-12) 500 mcg tablet Take 1 tablet (500 mcg) by mouth once daily.      levothyroxine (Synthroid, Levoxyl) 75 mcg tablet TAKE 1 TABLET BY MOUTH ONCE  DAILY 90 tablet 3    lisinopril 20 mg tablet TAKE 1 TABLET BY MOUTH ONCE  DAILY AT BEDTIME 90 tablet 3    lisinopriL-hydrochlorothiazide 20-25 mg tablet TAKE 1 TABLET BY MOUTH ONCE  DAILY (Patient taking differently: Take 1 tablet by mouth once daily in the morning.) 90 tablet 3    lovastatin (Mevacor) 20 mg tablet TAKE 1 TABLET BY MOUTH ONCE  DAILY AT BEDTIME 90 tablet 3     Lumigan 0.01 % ophthalmic solution Administer 1 drop into affected eye(s) once daily at bedtime.      magnesium 30 mg tablet Take 1 tablet (30 mg) by mouth twice a day. (Patient taking differently: Take 200 mg by mouth twice a day.)      metFORMIN  mg 24 hr tablet TAKE 1 TABLET BY MOUTH ONCE  DAILY WITH BREAKFAST DO NOT  CRUSH, CHEW, OR SPLIT 90 tablet 3    multivitamin tablet Take 1 tablet by mouth once daily.      potassium citrate 99 mg capsule Take by mouth.      fluorometholone (Eflone) 0.1 % ophthalmic suspension Administer 1 drop into the right eye 3 times a day. (Patient not taking: Reported on 6/26/2025)      fluorometholone (FML) 0.1 % ophthalmic suspension Administer 1 drop into affected eye(s) 3 times a day. (Patient not taking: Reported on 6/26/2025)      ibuprofen 200 mg tablet Take 1 tablet (200 mg) by mouth. (Patient not taking: Reported on 6/26/2025)      metFORMIN  mg 24 hr tablet TAKE 1 TABLET BY MOUTH ONCE  DAILY WITH BREAKFAST DO NOT  CRUSH, CHEW, OR SPLIT (Patient not taking: Reported on 6/26/2025) 90 tablet 3    [DISCONTINUED] amLODIPine (Norvasc) 5 mg tablet TAKE 1 TABLET BY MOUTH ONCE  DAILY 90 tablet 3     No facility-administered encounter medications on file as of 6/26/2025.

## 2025-07-18 ENCOUNTER — TELEPHONE (OUTPATIENT)
Age: 85
End: 2025-07-18
Payer: MEDICARE

## 2025-07-18 LAB
ALBUMIN SERPL-MCNC: 4.4 G/DL (ref 3.6–5.1)
ALBUMIN/CREAT UR: NORMAL MG/G CREAT
ALP SERPL-CCNC: 54 U/L (ref 37–153)
ALT SERPL-CCNC: 15 U/L (ref 6–29)
ANION GAP SERPL CALCULATED.4IONS-SCNC: 7 MMOL/L (CALC) (ref 7–17)
AST SERPL-CCNC: 15 U/L (ref 10–35)
BASOPHILS # BLD AUTO: 19 CELLS/UL (ref 0–200)
BASOPHILS NFR BLD AUTO: 0.3 %
BILIRUB SERPL-MCNC: 0.4 MG/DL (ref 0.2–1.2)
BUN SERPL-MCNC: 22 MG/DL (ref 7–25)
CALCIUM SERPL-MCNC: 10 MG/DL (ref 8.6–10.4)
CHLORIDE SERPL-SCNC: 98 MMOL/L (ref 98–110)
CHOLEST SERPL-MCNC: 159 MG/DL
CHOLEST/HDLC SERPL: 2.8 (CALC)
CO2 SERPL-SCNC: 29 MMOL/L (ref 20–32)
CREAT SERPL-MCNC: 0.71 MG/DL (ref 0.6–0.95)
CREAT UR-MCNC: 24 MG/DL (ref 20–275)
EGFRCR SERPLBLD CKD-EPI 2021: 85 ML/MIN/1.73M2
EOSINOPHIL # BLD AUTO: 77 CELLS/UL (ref 15–500)
EOSINOPHIL NFR BLD AUTO: 1.2 %
ERYTHROCYTE [DISTWIDTH] IN BLOOD BY AUTOMATED COUNT: 11.8 % (ref 11–15)
EST. AVERAGE GLUCOSE BLD GHB EST-MCNC: 183 MG/DL
EST. AVERAGE GLUCOSE BLD GHB EST-SCNC: 10.1 MMOL/L
GLUCOSE SERPL-MCNC: 169 MG/DL (ref 65–99)
HBA1C MFR BLD: 8 %
HCT VFR BLD AUTO: 36.7 % (ref 35–45)
HDLC SERPL-MCNC: 57 MG/DL
HGB BLD-MCNC: 12.9 G/DL (ref 11.7–15.5)
LDLC SERPL CALC-MCNC: 78 MG/DL (CALC)
LYMPHOCYTES # BLD AUTO: 2426 CELLS/UL (ref 850–3900)
LYMPHOCYTES NFR BLD AUTO: 37.9 %
MCH RBC QN AUTO: 35.4 PG (ref 27–33)
MCHC RBC AUTO-ENTMCNC: 35.1 G/DL (ref 32–36)
MCV RBC AUTO: 100.8 FL (ref 80–100)
MICROALBUMIN UR-MCNC: <0.2 MG/DL
MONOCYTES # BLD AUTO: 787 CELLS/UL (ref 200–950)
MONOCYTES NFR BLD AUTO: 12.3 %
NEUTROPHILS # BLD AUTO: 3091 CELLS/UL (ref 1500–7800)
NEUTROPHILS NFR BLD AUTO: 48.3 %
NONHDLC SERPL-MCNC: 102 MG/DL (CALC)
PLATELET # BLD AUTO: 242 THOUSAND/UL (ref 140–400)
PMV BLD REES-ECKER: 9.7 FL (ref 7.5–12.5)
POTASSIUM SERPL-SCNC: 4.3 MMOL/L (ref 3.5–5.3)
PROT SERPL-MCNC: 7.1 G/DL (ref 6.1–8.1)
RBC # BLD AUTO: 3.64 MILLION/UL (ref 3.8–5.1)
SODIUM SERPL-SCNC: 134 MMOL/L (ref 135–146)
TRIGL SERPL-MCNC: 147 MG/DL
TSH SERPL-ACNC: 1.11 MIU/L (ref 0.4–4.5)
WBC # BLD AUTO: 6.4 THOUSAND/UL (ref 3.8–10.8)

## 2025-07-18 NOTE — TELEPHONE ENCOUNTER
Patient called in stating she went and got her labs done yesterday. They told her for the B12 the diagnosis code used was not covered and was going to be expensive. She did not have this done. She wants to know if this is necessary to have done?

## 2025-08-04 DIAGNOSIS — E03.9 HYPOTHYROIDISM (ACQUIRED): ICD-10-CM

## 2025-08-04 DIAGNOSIS — E11.9 TYPE 2 DIABETES MELLITUS WITHOUT COMPLICATION, WITHOUT LONG-TERM CURRENT USE OF INSULIN: ICD-10-CM

## 2025-08-04 DIAGNOSIS — E78.5 HYPERLIPIDEMIA, UNSPECIFIED HYPERLIPIDEMIA TYPE: ICD-10-CM

## 2025-08-04 DIAGNOSIS — I10 ESSENTIAL HYPERTENSION: ICD-10-CM

## 2025-08-04 DIAGNOSIS — N18.2 CKD (CHRONIC KIDNEY DISEASE) STAGE 2, GFR 60-89 ML/MIN: ICD-10-CM

## 2025-08-06 ENCOUNTER — APPOINTMENT (OUTPATIENT)
Age: 85
End: 2025-08-06
Payer: MEDICARE

## 2025-08-06 VITALS
BODY MASS INDEX: 23.46 KG/M2 | WEIGHT: 146 LBS | HEART RATE: 75 BPM | OXYGEN SATURATION: 95 % | HEIGHT: 66 IN | DIASTOLIC BLOOD PRESSURE: 78 MMHG | SYSTOLIC BLOOD PRESSURE: 132 MMHG

## 2025-08-06 DIAGNOSIS — E11.9 TYPE 2 DIABETES MELLITUS WITHOUT COMPLICATION, WITHOUT LONG-TERM CURRENT USE OF INSULIN: ICD-10-CM

## 2025-08-06 DIAGNOSIS — E11.22 TYPE 2 DIABETES MELLITUS WITH STAGE 2 CHRONIC KIDNEY DISEASE, WITHOUT LONG-TERM CURRENT USE OF INSULIN (MULTI): ICD-10-CM

## 2025-08-06 DIAGNOSIS — N18.2 CKD (CHRONIC KIDNEY DISEASE) STAGE 2, GFR 60-89 ML/MIN: ICD-10-CM

## 2025-08-06 DIAGNOSIS — Z12.31 VISIT FOR SCREENING MAMMOGRAM: ICD-10-CM

## 2025-08-06 DIAGNOSIS — N18.2 TYPE 2 DIABETES MELLITUS WITH STAGE 2 CHRONIC KIDNEY DISEASE, WITHOUT LONG-TERM CURRENT USE OF INSULIN (MULTI): ICD-10-CM

## 2025-08-06 DIAGNOSIS — I10 ESSENTIAL HYPERTENSION: ICD-10-CM

## 2025-08-06 DIAGNOSIS — G47.33 OBSTRUCTIVE SLEEP APNEA SYNDROME: ICD-10-CM

## 2025-08-06 DIAGNOSIS — Z00.00 ROUTINE GENERAL MEDICAL EXAMINATION AT HEALTH CARE FACILITY: Primary | ICD-10-CM

## 2025-08-06 DIAGNOSIS — E03.9 HYPOTHYROIDISM (ACQUIRED): ICD-10-CM

## 2025-08-06 DIAGNOSIS — E78.5 HYPERLIPIDEMIA, UNSPECIFIED HYPERLIPIDEMIA TYPE: ICD-10-CM

## 2025-08-06 PROBLEM — N18.9 CHRONIC RENAL INSUFFICIENCY: Status: RESOLVED | Noted: 2024-06-27 | Resolved: 2025-08-06

## 2025-08-06 PROBLEM — E05.90 HYPERTHYROIDISM: Status: RESOLVED | Noted: 2024-08-08 | Resolved: 2025-08-06

## 2025-08-06 RX ORDER — METFORMIN HYDROCHLORIDE 500 MG/1
1000 TABLET, EXTENDED RELEASE ORAL DAILY
Qty: 180 TABLET | Refills: 3 | Status: SHIPPED | OUTPATIENT
Start: 2025-08-06

## 2025-08-06 ASSESSMENT — ACTIVITIES OF DAILY LIVING (ADL)
DOING_HOUSEWORK: INDEPENDENT
TAKING_MEDICATION: INDEPENDENT
BATHING: INDEPENDENT
DRESSING: INDEPENDENT
MANAGING_FINANCES: INDEPENDENT
GROCERY_SHOPPING: INDEPENDENT

## 2025-08-06 ASSESSMENT — PATIENT HEALTH QUESTIONNAIRE - PHQ9
2. FEELING DOWN, DEPRESSED OR HOPELESS: NOT AT ALL
SUM OF ALL RESPONSES TO PHQ9 QUESTIONS 1 AND 2: 0
1. LITTLE INTEREST OR PLEASURE IN DOING THINGS: NOT AT ALL

## 2025-08-06 ASSESSMENT — ENCOUNTER SYMPTOMS
LOSS OF SENSATION IN FEET: 0
OCCASIONAL FEELINGS OF UNSTEADINESS: 0
DEPRESSION: 0

## 2025-08-06 NOTE — PROGRESS NOTES
"Subjective   Reason for Visit: Debo Rodriguez is an 84 y.o. female here for a Medicare Wellness visit.     Past Medical, Surgical, and Family History reviewed and updated in chart.    Reviewed all medications by prescribing practitioner or clinical pharmacist (such as prescriptions, OTCs, herbal therapies and supplements) and documented in the medical record.    Here for follow up HTN, DM, high chol, GERD, osteoporosis, hypothyroidism, JOVANNA on CPAP - tolerating well.  She has a h/o GBS in 2009.  Developed diabetes at that time.  She is feeling good.  No specific concerns at this time.  A1C is a little bit higher and we will increase her metformin.      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      Advance directives:. Advanced Care Planning discussed and documented advance care plan or surrogate decision maker documented in the medical record. Patient has living will. Patient has healthcare POA.               Patient Care Team:  Rizwana Daley MD as PCP - General (Family Medicine)  Rizwana Daley MD as PCP - [facility] Attributed Provider         Objective   Vitals:  /78   Pulse 75   Ht 1.676 m (5' 6\")   Wt 66.2 kg (146 lb)   SpO2 95%   BMI 23.57 kg/m²       Physical Exam  Vitals reviewed.   Constitutional:       General: She is not in acute distress.    Cardiovascular:      Rate and Rhythm: Normal rate and regular rhythm.      Heart sounds: No murmur heard.  Pulmonary:      Effort: Pulmonary effort is normal. No respiratory distress.      Breath sounds: Normal breath sounds.     Skin:     General: Skin is warm and dry.     Neurological:      General: No focal deficit present.      Mental Status: She is alert. Mental status is at baseline.        Latest Reference Range & Units 07/17/25 09:59   GLUCOSE 65 - 99 mg/dL 169 (H)   SODIUM 135 - 146 mmol/L 134 (L)   POTASSIUM 3.5 - 5.3 mmol/L 4.3   CHLORIDE 98 - 110 mmol/L 98   CARBON DIOXIDE 20 - 32 mmol/L 29 "   ELECTROLYTE BALANCE 7 - 17 mmol/L (calc) 7   UREA NITROGEN (BUN) 7 - 25 mg/dL 22   CREATININE 0.60 - 0.95 mg/dL 0.71   EGFR > OR = 60 mL/min/1.73m2 85   CALCIUM 8.6 - 10.4 mg/dL 10.0   ALBUMIN 3.6 - 5.1 g/dL 4.4   PROTEIN, TOTAL 6.1 - 8.1 g/dL 7.1   ALKALINE PHOSPHATASE 37 - 153 U/L 54   ALT 6 - 29 U/L 15   AST 10 - 35 U/L 15   BILIRUBIN, TOTAL 0.2 - 1.2 mg/dL 0.4   CHOLESTEROL, TOTAL <200 mg/dL 159   HDL CHOLESTEROL > OR = 50 mg/dL 57   CHOL/HDLC RATIO <5.0 (calc) 2.8   LDL-CHOLESTEROL mg/dL (calc) 78   TRIGLYCERIDES <150 mg/dL 147   NON HDL CHOLESTEROL <130 mg/dL (calc) 102   HEMOGLOBIN A1c <5.7 % 8.0 (H)   eAG (mg/dL) mg/dL 183   eAG (mmol/L) mmol/L 10.1   TSH 0.40 - 4.50 mIU/L 1.11   WHITE BLOOD CELL COUNT 3.8 - 10.8 Thousand/uL 6.4   RED BLOOD CELL COUNT 3.80 - 5.10 Million/uL 3.64 (L)   HEMOGLOBIN 11.7 - 15.5 g/dL 12.9   HEMATOCRIT 35.0 - 45.0 % 36.7   MCV 80.0 - 100.0 fL 100.8 (H)   MCH 27.0 - 33.0 pg 35.4 (H)   MCHC 32.0 - 36.0 g/dL 35.1   RDW 11.0 - 15.0 % 11.8   PLATELET COUNT 140 - 400 Thousand/uL 242   MPV 7.5 - 12.5 fL 9.7   ABSOLUTE NEUTROPHILS 1,500 - 7,800 cells/uL 3,091   ABSOLUTE LYMPHOCYTES 850 - 3,900 cells/uL 2,426   ABSOLUTE MONOCYTES 200 - 950 cells/uL 787   ABSOLUTE EOSINOPHILS 15 - 500 cells/uL 77   ABSOLUTE BASOPHILS 0 - 200 cells/uL 19   NEUTROPHILS % 48.3   LYMPHOCYTES % 37.9   MONOCYTES % 12.3   EOSINOPHILS % 1.2   BASOPHILS % 0.3   CREATININE, RANDOM URINE 20 - 275 mg/dL 24   ALBUMIN/CREATININE RATIO, RANDOM URINE <30 mg/g creat NOTE   ALBUMIN, URINE See Note: mg/dL <0.2   (H): Data is abnormally high  (L): Data is abnormally low  Assessment & Plan  Routine general medical examination at health care facility         Type 2 diabetes mellitus without complication, without long-term current use of insulin    Orders:    metFORMIN  mg 24 hr tablet; Take 2 tablets (1,000 mg) by mouth once daily. Do not crush, chew, or split.    Follow Up In Primary Care - Established; Future    CBC  and Auto Differential; Future    Comprehensive Metabolic Panel; Future    Albumin-Creatinine Ratio, Urine Random; Future    Hemoglobin A1C; Future    Lipid Panel; Future    TSH with reflex to Free T4 if abnormal; Future    Visit for screening mammogram    Orders:    BI mammo bilateral screening tomosynthesis; Future    CKD (chronic kidney disease) stage 2, GFR 60-89 ml/min    Orders:    Follow Up In Primary Care - Established; Future    CBC and Auto Differential; Future    Comprehensive Metabolic Panel; Future    Albumin-Creatinine Ratio, Urine Random; Future    Hemoglobin A1C; Future    Lipid Panel; Future    TSH with reflex to Free T4 if abnormal; Future    Essential hypertension    Orders:    Follow Up In Primary Care - Established; Future    CBC and Auto Differential; Future    Comprehensive Metabolic Panel; Future    Albumin-Creatinine Ratio, Urine Random; Future    Hemoglobin A1C; Future    Lipid Panel; Future    TSH with reflex to Free T4 if abnormal; Future    Hyperlipidemia, unspecified hyperlipidemia type    Orders:    Follow Up In Primary Care - Established; Future    CBC and Auto Differential; Future    Comprehensive Metabolic Panel; Future    Albumin-Creatinine Ratio, Urine Random; Future    Hemoglobin A1C; Future    Lipid Panel; Future    TSH with reflex to Free T4 if abnormal; Future    Hypothyroidism (acquired)    Orders:    Follow Up In Primary Care - Established; Future    CBC and Auto Differential; Future    Comprehensive Metabolic Panel; Future    Albumin-Creatinine Ratio, Urine Random; Future    Hemoglobin A1C; Future    Lipid Panel; Future    TSH with reflex to Free T4 if abnormal; Future    Obstructive sleep apnea syndrome         Type 2 diabetes mellitus with stage 2 chronic kidney disease, without long-term current use of insulin (Multi)    Orders:    Follow Up In Primary Care - Established; Future    CBC and Auto Differential; Future    Comprehensive Metabolic Panel; Future     Albumin-Creatinine Ratio, Urine Random; Future    Hemoglobin A1C; Future    Lipid Panel; Future    TSH with reflex to Free T4 if abnormal; Future

## 2025-08-06 NOTE — ASSESSMENT & PLAN NOTE
Orders:    metFORMIN  mg 24 hr tablet; Take 2 tablets (1,000 mg) by mouth once daily. Do not crush, chew, or split.    Follow Up In Primary Care - Established; Future    CBC and Auto Differential; Future    Comprehensive Metabolic Panel; Future    Albumin-Creatinine Ratio, Urine Random; Future    Hemoglobin A1C; Future    Lipid Panel; Future    TSH with reflex to Free T4 if abnormal; Future

## 2025-08-06 NOTE — ASSESSMENT & PLAN NOTE
Orders:    Follow Up In Primary Care - Established; Future    CBC and Auto Differential; Future    Comprehensive Metabolic Panel; Future    Albumin-Creatinine Ratio, Urine Random; Future    Hemoglobin A1C; Future    Lipid Panel; Future    TSH with reflex to Free T4 if abnormal; Future

## 2025-08-06 NOTE — PATIENT INSTRUCTIONS
Continue current medications - will increase metformin to 1000 mg daily.  Follow up with specialists as scheduled.  Follow up in 6 months, sooner if needed.

## 2026-02-05 ENCOUNTER — APPOINTMENT (OUTPATIENT)
Age: 86
End: 2026-02-05
Payer: MEDICARE

## 2026-06-26 ENCOUNTER — APPOINTMENT (OUTPATIENT)
Dept: CARDIOLOGY | Facility: CLINIC | Age: 86
End: 2026-06-26
Payer: MEDICARE

## (undated) DEVICE — EYE SHIELD LENSES IN DISPENSER BOX

## (undated) DEVICE — NEEDLE, HYPODERMIC, REGULAR WALL, REGULAR BEVEL, 30 G X 0.5 IN

## (undated) DEVICE — PAD, EYE, OVAL, 1 5/8 X 2 5/8 IN, STERILE

## (undated) DEVICE — Device

## (undated) DEVICE — GLOVE, PROTEXIS PI CLASSIC, SZ-8.0, PF, PF, LF

## (undated) DEVICE — NEEDLE, RETROBULBAR 25GA X 1 1/4"

## (undated) DEVICE — TOWEL PACK, STERILE, 4/PACK, BLUE

## (undated) DEVICE — IPRISM S, SINGLE USE GONIOSCOPIC - LEFT

## (undated) DEVICE — NEEDLE, HYPODERMIC, REGULAR WALL, REGULAR BEVEL, 18 G X 1 IN

## (undated) DEVICE — DEVICE, OMNI SURGICAL SYSTEM, MDL 1-108